# Patient Record
Sex: MALE | Race: WHITE | Employment: OTHER | URBAN - METROPOLITAN AREA
[De-identification: names, ages, dates, MRNs, and addresses within clinical notes are randomized per-mention and may not be internally consistent; named-entity substitution may affect disease eponyms.]

---

## 2019-10-22 ENCOUNTER — OFFICE VISIT (OUTPATIENT)
Dept: OTOLARYNGOLOGY | Facility: CLINIC | Age: 64
End: 2019-10-22
Payer: COMMERCIAL

## 2019-10-22 ENCOUNTER — TELEPHONE (OUTPATIENT)
Dept: ENDOCRINOLOGY | Facility: CLINIC | Age: 64
End: 2019-10-22

## 2019-10-22 VITALS
HEIGHT: 68 IN | OXYGEN SATURATION: 96 % | BODY MASS INDEX: 32.74 KG/M2 | DIASTOLIC BLOOD PRESSURE: 96 MMHG | HEART RATE: 94 BPM | WEIGHT: 216 LBS | SYSTOLIC BLOOD PRESSURE: 156 MMHG

## 2019-10-22 DIAGNOSIS — H69.91 DYSFUNCTION OF RIGHT EUSTACHIAN TUBE: Primary | ICD-10-CM

## 2019-10-22 DIAGNOSIS — J32.4 CHRONIC PANSINUSITIS: ICD-10-CM

## 2019-10-22 DIAGNOSIS — H65.91 OME (OTITIS MEDIA WITH EFFUSION), RIGHT: ICD-10-CM

## 2019-10-22 PROCEDURE — 69433 CREATE EARDRUM OPENING: CPT | Mod: RT | Performed by: OTOLARYNGOLOGY

## 2019-10-22 PROCEDURE — 99204 OFFICE O/P NEW MOD 45 MIN: CPT | Mod: 25 | Performed by: OTOLARYNGOLOGY

## 2019-10-22 RX ORDER — METHYLPREDNISOLONE 4 MG
TABLET, DOSE PACK ORAL
Qty: 21 TABLET | Refills: 0 | Status: SHIPPED | OUTPATIENT
Start: 2019-10-22 | End: 2019-10-22

## 2019-10-22 RX ORDER — OFLOXACIN 3 MG/ML
5 SOLUTION AURICULAR (OTIC) 2 TIMES DAILY
Qty: 10 ML | Refills: 0 | Status: SHIPPED | OUTPATIENT
Start: 2019-10-22 | End: 2019-10-22

## 2019-10-22 RX ORDER — METHYLPREDNISOLONE 4 MG
TABLET, DOSE PACK ORAL
Qty: 21 TABLET | Refills: 0 | Status: SHIPPED | OUTPATIENT
Start: 2019-10-22

## 2019-10-22 RX ORDER — OFLOXACIN 3 MG/ML
5 SOLUTION AURICULAR (OTIC) 2 TIMES DAILY
Qty: 10 ML | Refills: 0 | Status: SHIPPED | OUTPATIENT
Start: 2019-10-22 | End: 2021-01-20

## 2019-10-22 ASSESSMENT — MIFFLIN-ST. JEOR: SCORE: 1754.27

## 2019-10-22 NOTE — PROGRESS NOTES
Chief Complaint - sinusitis, right ear plugging    History of Present Illness - Rick Wayne is a 64 year old male who presents for evaluation of chronic sinusitis with acute exacerbation. The patient describes symptoms of right ear plugging, left purulent mucous, trying valsalva and hears a whistle in the left side. This is going on for months. The patient notes dall allergies. Treatments have included antibiotics (keflex), nasal steroids, nasal saline irrigations, oral antihistamines, and sudafed. The treatments seem to not help. 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 He saw Dr. Diaz in 2010. He thinks he had a right tympanic membrane perforation when he was young. No tubes. Has some pulsatile tinnitus.     Past Medical History -  - chronic sinusitis with nasal polyps  - spleen marginal zone lymphoma  - ETD    Current Medications -   Current Outpatient Medications:      GEMFIBROZIL 300 MG PO HALF-TABS, twice daily, Disp: , Rfl:      LORAZEPAM 0.5 MG PO TABS, prn, Disp: , Rfl:      PRILOSEC 10 MG PO CPDR, 2 CAPSULES DAILY, Disp: , Rfl:     Allergies - No Known Allergies    Social History -   Social History     Socioeconomic History     Marital status:      Spouse name: Not on file     Number of children: Not on file     Years of education: Not on file     Highest education level: Not on file   Occupational History     Not on file   Social Needs     Financial resource strain: Not on file     Food insecurity:     Worry: Not on file     Inability: Not on file     Transportation needs:     Medical: Not on file     Non-medical: Not on file   Tobacco Use     Smoking status: Current Every Day Smoker     Packs/day: 1.00   Substance and Sexual Activity     Alcohol use: Yes     Drug use: No     Sexual activity: Not on file   Lifestyle     Physical activity:     Days per week: Not on file     Minutes per session:  "Not on file     Stress: Not on file   Relationships     Social connections:     Talks on phone: Not on file     Gets together: Not on file     Attends Rastafari service: Not on file     Active member of club or organization: Not on file     Attends meetings of clubs or organizations: Not on file     Relationship status: Not on file     Intimate partner violence:     Fear of current or ex partner: Not on file     Emotionally abused: Not on file     Physically abused: Not on file     Forced sexual activity: Not on file   Other Topics Concern     Parent/sibling w/ CABG, MI or angioplasty before 65F 55M? Not Asked   Social History Narrative     Not on file       Family History - brother may have had an ear tube    Review of Systems - As per HPI and PMHx, otherwise 10+ comprehensive system review is negative.    Physical Exam  BP (!) 156/96   Pulse 94   Ht 1.727 m (5' 8\")   Wt 98 kg (216 lb)   SpO2 96%   BMI 32.84 kg/m    General - The patient is nontoxic, in no distress. Alert and oriented to person and place, answers questions and cooperates with examination appropriately.   Neurologic - CN II-XII are intact. No focal neurologic deficits.   Voice and Breathing - The patient was breathing comfortably without the use of accessory muscles. There was no wheezing, stridor, or stertor.  The patients voice was clear and strong.  Eyes - Extraocular movements intact.  Sclera were not icteric or injected, conjunctiva were pink and moist.  Mouth - Examination of the oral cavity showed pink, healthy oral mucosa. No lesions or ulcerations noted.  The tongue was mobile and midline.  Throat - The walls of the oropharynx were smooth, symmetric, and had no lesions or ulcerations.  No postnasal drainage.  The uvula was midline on elevation.  Ears - both ear canals are clean and clear.  Right tympanic membrane is retracted with a fuentes effusion, and no acute infection.  Left ear canal is clear.  The left tympanic membrane is " retracted.  He is able to Valsalva and inflate the left middle ear and I can see a tiny microperforation with air egress in the superior posterior quadrant.  No significant fluid in the middle ear.  Nose - External contour is symmetric, no gross deflection or scars.  Nasal mucosa is pink and moist with no abnormal mucus.  The septum was mildly deviated to the right along the floor.  There is some scarring. turbinates somewhat hypertrophic.  No polyps, masses, or purulence noted on examination.  Neck - Palpation of the occipital, submental, submandibular, internal jugular chain, and supraclavicular nodes did not demonstrate any abnormal lymph nodes or masses. No parotid masses. Palpation of the thyroid was soft and smooth, with no nodules or goiter appreciated.  The trachea was mobile and midline.  Cardiovascular - carotid pulses are 2+ bilaterally, regular rhythm      right Myringotomy with Tube Placement    Procedure - After discussion of the risks and benefits of myringotomy, informed consent was signed and placed in the chart.  I began with the right side.  I proceeded to position the patient in a semi-supine position in the examination chair.  Using the binocular surgical microscope, I then proceeded to clean the right ear canal free cerumen and squamous debris.  I was able to see the tympanic membrane.  Using a small cotton tipped applicator, I applied a tiny coating of phenol onto the tympanic membrane.  After visualizing a good eri, I then proceeded to use a myringotomy knife to make a radially oriented incision in the posterior and inferior quadrant of the right tympanic membrane.  A moderate amount of clear yellow effusion was suctioned away.  Next, I proceeded to place a duravent tube through the incision.  After confirming good positioning and a clearly visible open tube, the procedure was complete.      A/P - Rick Wayne is a 64 year old male with right otitis media with effusion. Has chronic ETD.  I placed a right ear tube today.  We discussed water precautions.  He should use ofloxacin for a few days to prevent infection.  He can also work on popping his ear for his left-sided eustachian tube dysfunction.  He has a microperforation on the left but no fluid or infection.  There is chronic sinusitis and some pressure and therefore I recommend a Medrol Dosepak.  Return if this fails, and we can consider a CT sinus scan.    Aric Shetty MD  Otolaryngology  [facility]

## 2019-10-22 NOTE — TELEPHONE ENCOUNTER
Patient is calling back and [phone]. He said he seen Dr. Shetty today and he prescribed him some medication.  Please call patient back when faxed at  [phone]

## 2019-10-22 NOTE — TELEPHONE ENCOUNTER
Reason for Call:  Other prescription    Detailed comments:  The rx's were sent to [facility]. They need the billing info for [facility]. They also need the auhorization number on [id number].  Please fax to [phone].     Phone Number Patient can be reached at: Home number on file [phone] (home)    Best Time:  Any     Can we leave a detailed message on this number? YES    Call taken on 10/22/2019 at 12:29 PM by Laurel Alexis

## 2019-10-22 NOTE — PATIENT INSTRUCTIONS
General Scheduling Information  To schedule your CT/MRI scan, please contact [place] [facility] at [phone] [address] [place] [address]    To schedule your Surgery, please contact our Specialty Schedulers at [phone]    ENT Clinic Locations Clinic Hours Telephone Number     [facility]  [address] [place] [address]   Tuesday:       8:00am -- 4:00pm    Wednesday:  8:00am - 4:00pm   To schedule an appointment with   Dr. Shetty,   please contact our   Specialty Scheduling Department at:     [phone]       [facility]  [address] [place] [address]   Friday:          8:00am - 4:00pm         Urgent Care Locations Clinic Hours Telephone Numbers     [facility]iano  [address] [place] [address]     Monday-Friday:     11:00pm - 9:00pm    Saturday-Sunday:  9:00am - 5:00pm   [phone]     [facility]zco  [address] [place] [address]     Monday-Friday:      5:00pm - 9:00pm     Saturday-Sunday:  9:00am - 5:00pm   [phone]     Rick to follow up with Primary Care provider regarding elevated blood pressure.

## 2019-10-22 NOTE — LETTER
10/22/2019         RE: Rick Wayne  [address]  [facility] [address]        Dear Colleague,    Thank you for referring your patient, Rick Wayne, to the [facility]. Please see a copy of my visit note below.    Chief Complaint - sinusitis, right ear plugging    History of Present Illness - Rick Wayne is a 64 year old male who presents for evaluation of chronic sinusitis with acute exacerbation. The patient describes symptoms of right ear plugging, left purulent mucous, trying valsalva and hears a whistle in the left side. This is going on for months. The patient notes dall allergies. Treatments have included antibiotics (keflex), nasal steroids, nasal saline irrigations, oral antihistamines, and sudafed. The treatments seem to not help. 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 He saw Dr. Diaz in 2010. He thinks he had a right tympanic membrane perforation when he was young. No tubes. Has some pulsatile tinnitus.     Past Medical History -  - chronic sinusitis with nasal polyps  - spleen marginal zone lymphoma  - ETD    Current Medications -   Current Outpatient Medications:      GEMFIBROZIL 300 MG PO HALF-TABS, twice daily, Disp: , Rfl:      LORAZEPAM 0.5 MG PO TABS, prn, Disp: , Rfl:      PRILOSEC 10 MG PO CPDR, 2 CAPSULES DAILY, Disp: , Rfl:     Allergies - No Known Allergies    Social History -   Social History     Socioeconomic History     Marital status:      Spouse name: Not on file     Number of children: Not on file     Years of education: Not on file     Highest education level: Not on file   Occupational History     Not on file   Social Needs     Financial resource strain: Not on file     Food insecurity:     Worry: Not on file     Inability: Not on file     Transportation needs:     Medical: Not on file     Non-medical: Not on file   Tobacco Use     Smoking  "status: Current Every Day Smoker     Packs/day: 1.00   Substance and Sexual Activity     Alcohol use: Yes     Drug use: No     Sexual activity: Not on file   Lifestyle     Physical activity:     Days per week: Not on file     Minutes per session: Not on file     Stress: Not on file   Relationships     Social connections:     Talks on phone: Not on file     Gets together: Not on file     Attends Hindu service: Not on file     Active member of club or organization: Not on file     Attends meetings of clubs or organizations: Not on file     Relationship status: Not on file     Intimate partner violence:     Fear of current or ex partner: Not on file     Emotionally abused: Not on file     Physically abused: Not on file     Forced sexual activity: Not on file   Other Topics Concern     Parent/sibling w/ CABG, MI or angioplasty before 65F 55M? Not Asked   Social History Narrative     Not on file       Family History - brother may have had an ear tube    Review of Systems - As per HPI and PMHx, otherwise 10+ comprehensive system review is negative.    Physical Exam  BP (!) 156/96   Pulse 94   Ht 1.727 m (5' 8\")   Wt 98 kg (216 lb)   SpO2 96%   BMI 32.84 kg/m     General - The patient is nontoxic, in no distress. Alert and oriented to person and place, answers questions and cooperates with examination appropriately.   Neurologic - CN II-XII are intact. No focal neurologic deficits.   Voice and Breathing - The patient was breathing comfortably without the use of accessory muscles. There was no wheezing, stridor, or stertor.  The patients voice was clear and strong.  Eyes - Extraocular movements intact.  Sclera were not icteric or injected, conjunctiva were pink and moist.  Mouth - Examination of the oral cavity showed pink, healthy oral mucosa. No lesions or ulcerations noted.  The tongue was mobile and midline.  Throat - The walls of the oropharynx were smooth, symmetric, and had no lesions or ulcerations.  No " postnasal drainage.  The uvula was midline on elevation.  Ears - both ear canals are clean and clear.  Right tympanic membrane is retracted with a fuentes effusion, and no acute infection.  Left ear canal is clear.  The left tympanic membrane is retracted.  He is able to Valsalva and inflate the left middle ear and I can see a tiny microperforation with air egress in the superior posterior quadrant.  No significant fluid in the middle ear.  Nose - External contour is symmetric, no gross deflection or scars.  Nasal mucosa is pink and moist with no abnormal mucus.  The septum was mildly deviated to the right along the floor.  There is some scarring. turbinates somewhat hypertrophic.  No polyps, masses, or purulence noted on examination.  Neck - Palpation of the occipital, submental, submandibular, internal jugular chain, and supraclavicular nodes did not demonstrate any abnormal lymph nodes or masses. No parotid masses. Palpation of the thyroid was soft and smooth, with no nodules or goiter appreciated.  The trachea was mobile and midline.  Cardiovascular - carotid pulses are 2+ bilaterally, regular rhythm      right Myringotomy with Tube Placement    Procedure - After discussion of the risks and benefits of myringotomy, informed consent was signed and placed in the chart.  I began with the right side.  I proceeded to position the patient in a semi-supine position in the examination chair.  Using the binocular surgical microscope, I then proceeded to clean the right ear canal free cerumen and squamous debris.  I was able to see the tympanic membrane.  Using a small cotton tipped applicator, I applied a tiny coating of phenol onto the tympanic membrane.  After visualizing a good eri, I then proceeded to use a myringotomy knife to make a radially oriented incision in the posterior and inferior quadrant of the right tympanic membrane.  A moderate amount of clear yellow effusion was suctioned away.  Next, I proceeded  to place a duravent tube through the incision.  After confirming good positioning and a clearly visible open tube, the procedure was complete.      A/P - Rick Wayne is a 64 year old male with right otitis media with effusion. Has chronic ETD. I placed a right ear tube today.  We discussed water precautions.  He should use ofloxacin for a few days to prevent infection.  He can also work on popping his ear for his left-sided eustachian tube dysfunction.  He has a microperforation on the left but no fluid or infection.  There is chronic sinusitis and some pressure and therefore I recommend a Medrol Dosepak.  Return if this fails, and we can consider a CT sinus scan.    Aric Shetty MD  Otolaryngology  [facility]      Again, thank you for allowing me to participate in the care of your patient.        Sincerely,        Aric Shetty MD

## 2019-11-25 ENCOUNTER — TELEPHONE (OUTPATIENT)
Dept: OTOLARYNGOLOGY | Facility: CLINIC | Age: 64
End: 2019-11-25

## 2019-11-25 DIAGNOSIS — J32.4 CHRONIC PANSINUSITIS: ICD-10-CM

## 2019-11-25 DIAGNOSIS — J32.4 CHRONIC PANSINUSITIS: Primary | ICD-10-CM

## 2019-11-25 RX ORDER — DOXYCYCLINE 100 MG/1
100 CAPSULE ORAL 2 TIMES DAILY
Qty: 28 CAPSULE | Refills: 0 | Status: SHIPPED | OUTPATIENT
Start: 2019-11-25 | End: 2019-11-26

## 2019-11-25 NOTE — TELEPHONE ENCOUNTER
Reason for call:  Other   Patient called regarding (reason for call): call back  Additional comments:  His sinus are bad. He finished the rx given. Please call to advise.     Phone number to reach patient:  Home number on file [phone] (home)    Best Time:  Any      Can we leave a detailed message on this number?  YES

## 2019-11-25 NOTE — TELEPHONE ENCOUNTER
10/22/19 Patient was seen in clinic by Dr. Shetty, given a Medrol Dosepak for chronic sinusitis. Note states patient should return if this fails, and can consider a CT sinus scan.     Patient reports pressure and pain under his left eye- kept him up most of the night last night. Patient feels he has had a fever, however check and does not at this time.   Reports nasal drainage that is bloody, clotted, yellow/stringy drainage. Patient states last time he had this drainage he tested positive for pseudomonas, so is very concerned.     Advised routing to provider to advise. Okay to leave detailed message for patient.     Razia Sparks RN on 11/25/2019 at 11:16 AM

## 2019-11-25 NOTE — TELEPHONE ENCOUNTER
Notified patient, who states that [phone] in order to have it filled.     Patient asking for what to do for the pressure in the mean time- we will start taking ibuprofen and tylenol regularly per package instruction. Any further recommendations- as he knows he won't get his medication for a couple more days at least.     Razia Sparks RN on 11/25/2019 at 11:52 AM

## 2019-11-25 NOTE — PROGRESS NOTES
I prescribed doxycycline. He should take this course and return to see me in clinic. If things worsen he needs to contact me sooner and we can consider urgent evaluation or CT sinuses.

## 2019-11-26 ENCOUNTER — TELEPHONE (OUTPATIENT)
Dept: OTOLARYNGOLOGY | Facility: CLINIC | Age: 64
End: 2019-11-26

## 2019-11-26 DIAGNOSIS — J32.4 CHRONIC PANSINUSITIS: ICD-10-CM

## 2019-11-26 RX ORDER — DOXYCYCLINE 100 MG/1
100 CAPSULE ORAL 2 TIMES DAILY
Qty: 28 CAPSULE | Refills: 0 | Status: SHIPPED | OUTPATIENT
Start: 2019-11-26 | End: 2020-03-18

## 2019-11-26 RX ORDER — DOXYCYCLINE 100 MG/1
100 CAPSULE ORAL 2 TIMES DAILY
Qty: 28 CAPSULE | Refills: 0 | Status: SHIPPED | OUTPATIENT
Start: 2019-11-26 | End: 2019-11-26

## 2019-11-26 NOTE — TELEPHONE ENCOUNTER
Reason for Call:  Other call back    Detailed comments: [facility] is calling stating that [facility] and that people had left for holiday's already and told him that he wouldn't be able to get the prescription for 4-6 weeks, but requests to have it sent to :     [facility]   [address] [place] [address]  Phone : [phone]    Phone Number Patient can be reached at: Home number on file [phone] (home)    Best Time: Any     Can we leave a detailed message on this number? YES    Call taken on 11/26/2019 at 1:33 PM by Bonnie Quinones

## 2019-12-12 ENCOUNTER — TELEPHONE (OUTPATIENT)
Dept: OTOLARYNGOLOGY | Facility: CLINIC | Age: 64
End: 2019-12-12

## 2019-12-12 NOTE — TELEPHONE ENCOUNTER
Reason for call:  Other   Patient called regarding (reason for call): call back  Additional comments:  Patient calling. He was treated for a sinus infection. His dentist is going to do a root canal and he is concerned about it. Please call to advise.     Phone number to reach patient:  Home number on file [phone] (home)    Best Time:  any    Can we leave a detailed message on this number?  YES

## 2019-12-12 NOTE — TELEPHONE ENCOUNTER
Called call and had concerns post root canal 14 days ago. He was having issue with drainage/blood post root canal. He was then giving abx as he was having s/s with the blood/drainage after. He is now going in for his permanent filling and is concerned that this will happen again. Wondering if 14 days since his root canal is enough to heal so this wont happen again. Will reach out to MD for direction    Vicky Garcia RN Specialty Triage 12/12/2019 2:02 PM

## 2019-12-13 NOTE — TELEPHONE ENCOUNTER
Left message advising patient that Dr. Shetty recommends patient discuss whether this will be an issue with his dentist.    Blane Cha RN....12/13/2019 8:45 AM

## 2020-02-11 ENCOUNTER — DOCUMENTATION ONLY (OUTPATIENT)
Dept: OPTOMETRY | Facility: CLINIC | Age: 65
End: 2020-02-11

## 2020-02-12 ENCOUNTER — OFFICE VISIT (OUTPATIENT)
Dept: OPTOMETRY | Facility: CLINIC | Age: 65
End: 2020-02-12
Payer: COMMERCIAL

## 2020-02-12 DIAGNOSIS — H52.4 PRESBYOPIA: ICD-10-CM

## 2020-02-12 DIAGNOSIS — H25.13 NUCLEAR AGE-RELATED CATARACT, BOTH EYES: ICD-10-CM

## 2020-02-12 DIAGNOSIS — H52.13 MYOPIA OF BOTH EYES: ICD-10-CM

## 2020-02-12 DIAGNOSIS — Z01.01 ENCOUNTER FOR EXAMINATION OF EYES AND VISION WITH ABNORMAL FINDINGS: Primary | ICD-10-CM

## 2020-02-12 DIAGNOSIS — H52.223 REGULAR ASTIGMATISM OF BOTH EYES: ICD-10-CM

## 2020-02-12 PROCEDURE — 92015 DETERMINE REFRACTIVE STATE: CPT | Performed by: OPTOMETRIST

## 2020-02-12 PROCEDURE — 92004 COMPRE OPH EXAM NEW PT 1/>: CPT | Performed by: OPTOMETRIST

## 2020-02-12 ASSESSMENT — CUP TO DISC RATIO
OD_RATIO: 0.35
OS_RATIO: 0.35

## 2020-02-12 ASSESSMENT — EXTERNAL EXAM - LEFT EYE: OS_EXAM: NORMAL

## 2020-02-12 ASSESSMENT — CONF VISUAL FIELD
OD_NORMAL: 1
OS_NORMAL: 1

## 2020-02-12 ASSESSMENT — TONOMETRY
IOP_METHOD: APPLANATION
OS_IOP_MMHG: 23
OD_IOP_MMHG: 22

## 2020-02-12 ASSESSMENT — SLIT LAMP EXAM - LIDS: COMMENTS: 1+ MEIBOMIAN GLAND DYSFUNCTION

## 2020-02-12 ASSESSMENT — REFRACTION_MANIFEST
OS_SPHERE: -1.25
OD_SPHERE: -1.25
OD_AXIS: 025
OD_ADD: +2.50
OD_CYLINDER: +0.50
OS_CYLINDER: +0.50
OS_AXIS: 162
OS_ADD: +2.50

## 2020-02-12 ASSESSMENT — VISUAL ACUITY
OD_SC+: -2
METHOD: SNELLEN - LINEAR
OD_SC: 20/30 -3
OS_SC+: -1
OS_SC: 20/40
OD_SC: 20/20
OS_SC: 20/25

## 2020-02-12 ASSESSMENT — EXTERNAL EXAM - RIGHT EYE: OD_EXAM: NORMAL

## 2020-02-12 NOTE — LETTER
2/12/2020         RE: Rick Wayne  [address]  [place] [address]        Dear Colleague,    Thank you for referring your patient, Rick Wayne, to the [facility]. Please see a copy of my visit note below.    Chief Complaint   Patient presents with     Annual Eye Exam      Accompanied by self  Last Eye Exam: 1+ years ago  Dilated Previously: Yes    What are you currently using to see?  Glasses for driving at night only       Distance Vision Acuity: Noticed gradual change in both eyes    Near Vision Acuity: Not satisfied-readers +2.00    Eye Comfort: watery -right eye, usually when lying on right side  Do you use eye drops? : No  Occupation or Hobbies: retired    Sabiha Hussein, Optometry Tech          Medical, surgical and family histories reviewed and updated 2/12/2020.       OBJECTIVE: See Ophthalmology exam    ASSESSMENT:    ICD-10-CM    1. Encounter for examination of eyes and vision with abnormal findings Z01.01    2. Nuclear age-related cataract, both eyes H25.13    3. Myopia of both eyes H52.13    4. Regular astigmatism of both eyes H52.223    5. Presbyopia H52.4       PLAN:     Patient Instructions   The eyes may tear excessively due to dryness. I recommend trying regular use of warm compresses over each eye to improve tear film secretions.   Instructions:   Warm compresses once to twice daily for 5-10 minutes  Directions for warm soaks  There are few methods for hot compresses. Moisten a washcloth with hot water, or microwave for 10 seconds, being careful to not get the cloth too hot.   Then put the washcloth onto your eyelids for 5 minutes. It will cool quickly so a rice pack or eyemask that can be heated and laid on top of the washcloth will help retain the heat.     Rick was advised of today's exam findings.  Copy of glasses Rx provided today. Separate distance and reading, per patient request.     Return in 1 year for eye exam, or sooner if needed.    The effects of  the dilating drops last for 4- 6 hours.  You will be more sensitive to light and vision will be blurry up close.  Mydriatic sunglasses were given if needed.      Max Milner O.D.  [address] [place]  [address]    [phone]             Again, thank you for allowing me to participate in the care of your patient.        Sincerely,        Max Milner OD

## 2020-02-12 NOTE — PROGRESS NOTES
Chief Complaint   Patient presents with     Annual Eye Exam      Accompanied by self  Last Eye Exam: 1+ years ago  Dilated Previously: Yes    What are you currently using to see?  Glasses for driving at night only       Distance Vision Acuity: Noticed gradual change in both eyes    Near Vision Acuity: Not satisfied-readers +2.00    Eye Comfort: watery -right eye, usually when lying on right side  Do you use eye drops? : No  Occupation or Hobbies: retired    Sabiha Hussein, Optometry Tech          Medical, surgical and family histories reviewed and updated 2/12/2020.       OBJECTIVE: See Ophthalmology exam    ASSESSMENT:    ICD-10-CM    1. Encounter for examination of eyes and vision with abnormal findings Z01.01    2. Nuclear age-related cataract, both eyes H25.13    3. Myopia of both eyes H52.13    4. Regular astigmatism of both eyes H52.223    5. Presbyopia H52.4       PLAN:     Patient Instructions   The eyes may tear excessively due to dryness. I recommend trying regular use of warm compresses over each eye to improve tear film secretions.   Instructions:   Warm compresses once to twice daily for 5-10 minutes  Directions for warm soaks  There are few methods for hot compresses. Moisten a washcloth with hot water, or microwave for 10 seconds, being careful to not get the cloth too hot.   Then put the washcloth onto your eyelids for 5 minutes. It will cool quickly so a rice pack or eyemask that can be heated and laid on top of the washcloth will help retain the heat.     Rick was advised of today's exam findings.  Copy of glasses Rx provided today. Separate distance and reading, per patient request.     Return in 1 year for eye exam, or sooner if needed.    The effects of the dilating drops last for 4- 6 hours.  You will be more sensitive to light and vision will be blurry up close.  Mydriatic sunglasses were given if needed.      Max Milner O.D.  [address] [place] [place]  [address]    [phone]

## 2020-02-12 NOTE — PATIENT INSTRUCTIONS
The eyes may tear excessively due to dryness. I recommend trying regular use of warm compresses over each eye to improve tear film secretions.   Instructions:   Warm compresses once to twice daily for 5-10 minutes  Directions for warm soaks  There are few methods for hot compresses. Moisten a washcloth with hot water, or microwave for 10 seconds, being careful to not get the cloth too hot.   Then put the washcloth onto your eyelids for 5 minutes. It will cool quickly so a rice pack or eyemask that can be heated and laid on top of the washcloth will help retain the heat.     Rick was advised of today's exam findings.  Copy of glasses Rx provided today. Separate distance and reading, per patient request.     Return in 1 year for eye exam, or sooner if needed.    The effects of the dilating drops last for 4- 6 hours.  You will be more sensitive to light and vision will be blurry up close.  Mydriatic sunglasses were given if needed.      Max Milner O.D.  [address]  [place]  [address]    [phone]

## 2020-02-13 ENCOUNTER — OFFICE VISIT (OUTPATIENT)
Dept: OTOLARYNGOLOGY | Facility: CLINIC | Age: 65
End: 2020-02-13
Payer: COMMERCIAL

## 2020-02-13 VITALS
BODY MASS INDEX: 32.58 KG/M2 | RESPIRATION RATE: 16 BRPM | HEIGHT: 68 IN | OXYGEN SATURATION: 96 % | HEART RATE: 80 BPM | DIASTOLIC BLOOD PRESSURE: 94 MMHG | SYSTOLIC BLOOD PRESSURE: 153 MMHG | WEIGHT: 215 LBS

## 2020-02-13 DIAGNOSIS — H69.92 DYSFUNCTION OF LEFT EUSTACHIAN TUBE: Primary | ICD-10-CM

## 2020-02-13 PROCEDURE — 99213 OFFICE O/P EST LOW 20 MIN: CPT | Mod: 25 | Performed by: OTOLARYNGOLOGY

## 2020-02-13 PROCEDURE — 69433 CREATE EARDRUM OPENING: CPT | Mod: LT | Performed by: OTOLARYNGOLOGY

## 2020-02-13 ASSESSMENT — PAIN SCALES - GENERAL: PAINLEVEL: NO PAIN (0)

## 2020-02-13 ASSESSMENT — MIFFLIN-ST. JEOR: SCORE: 1749.73

## 2020-02-13 NOTE — PROGRESS NOTES
Chief Complaint - sinusitis, ear plugging    History of Present Illness - Rick Wayne is a 64 year old male who returns with ear concerns. The patient describes symptoms of bilateral ear plugging. Sometimes pain. This has been going on for months. He does get a noise out of left ear with valsalva. The patient notes fall allergies. Treatments have included sudafed. The treatments seem to help a little as well as valsalva. 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 He saw Dr. Diaz in 2010. He thinks he had a right tympanic membrane perforation when he was young. He had a right OME last visit 10/2019. I placed a right ear tube. He has a microperforation on the left but no fluid or infection. He gets some throbbing at night in ears.     In December he had tooth #14 removed. He had some bleeding. He was concerned about an issue of the root into the sinus. I treated him with doxycycline and it got better.     Past Medical History -  - chronic sinusitis with nasal polyps  - spleen marginal zone lymphoma  - ETD    Current Medications -   Current Outpatient Medications:      doxycycline hyclate (VIBRAMYCIN) 100 MG capsule, Take 1 capsule (100 mg) by mouth 2 times daily, Disp: 28 capsule, Rfl: 0     GEMFIBROZIL 300 MG PO HALF-TABS, twice daily, Disp: , Rfl:      LORAZEPAM 0.5 MG PO TABS, prn, Disp: , Rfl:      methylPREDNISolone (MEDROL DOSEPAK) 4 MG tablet therapy pack, Follow Package Directions, Disp: 21 tablet, Rfl: 0     ofloxacin (FLOXIN) 0.3 % otic solution, Place 5 drops into the right ear 2 times daily, Disp: 10 mL, Rfl: 0     PRILOSEC 10 MG PO CPDR, 2 CAPSULES DAILY, Disp: , Rfl:     Allergies - No Known Allergies    Social History -   Social History     Socioeconomic History     Marital status:      Spouse name: Not on file     Number of children: Not on file     Years of education: Not on file     Highest  "education level: Not on file   Occupational History     Not on file   Social Needs     Financial resource strain: Not on file     Food insecurity:     Worry: Not on file     Inability: Not on file     Transportation needs:     Medical: Not on file     Non-medical: Not on file   Tobacco Use     Smoking status: Current Every Day Smoker     Packs/day: 1.00   Substance and Sexual Activity     Alcohol use: Yes     Drug use: No     Sexual activity: Not on file   Lifestyle     Physical activity:     Days per week: Not on file     Minutes per session: Not on file     Stress: Not on file   Relationships     Social connections:     Talks on phone: Not on file     Gets together: Not on file     Attends Taoism service: Not on file     Active member of club or organization: Not on file     Attends meetings of clubs or organizations: Not on file     Relationship status: Not on file     Intimate partner violence:     Fear of current or ex partner: Not on file     Emotionally abused: Not on file     Physically abused: Not on file     Forced sexual activity: Not on file   Other Topics Concern     Parent/sibling w/ CABG, MI or angioplasty before 65F 55M? Not Asked   Social History Narrative     Not on file       Family History - brother may have had an ear tube    Review of Systems - As per HPI and PMHx, otherwise 7 system review of the head and neck is negative.      Physical Exam  BP (!) 163/104   Pulse 84   Resp 16   Ht 1.727 m (5' 8\")   Wt 97.5 kg (215 lb)   SpO2 96%   BMI 32.69 kg/m    General - The patient is nontoxic, in no distress. Alert and oriented to person and place, answers questions and cooperates with examination appropriately.   Neurologic - CN II-XII are intact. No focal neurologic deficits.   Voice and Breathing - The patient was breathing comfortably without the use of accessory muscles. There was no wheezing, stridor, or stertor.  The patients voice was clear and strong.  Eyes - Extraocular movements " intact.  Sclera were not icteric or injected, conjunctiva were pink and moist.  Mouth - Examination of the oral cavity showed pink, healthy oral mucosa. No lesions or ulcerations noted.  The tongue was mobile and midline.  Throat - The walls of the oropharynx were smooth, symmetric, and had no lesions or ulcerations.  No postnasal drainage.  The uvula was midline on elevation.  Ears - both ear canals are clean and clear.  Right tympanic membrane is normal with a tube in good position. No otorrhea.  Left ear canal is clear.  The left tympanic membrane is retracted.  He is able to Valsalva and inflate the left middle ear and I can see a tiny microperforation with air egress in the superior posterior quadrant. I suctioned a little mucous from this.  Nose - External contour is symmetric, no gross deflection or scars.  Nasal mucosa is pink and moist with no abnormal mucus.  The septum was mildly deviated to the right along the floor.  There is some scarring. turbinates somewhat hypertrophic.  No polyps, masses, or purulence noted on examination.  Neck - Palpation of the occipital, submental, submandibular, internal jugular chain, and supraclavicular nodes did not demonstrate any abnormal lymph nodes or masses. No parotid masses. Palpation of the thyroid was soft and smooth, with no nodules or goiter appreciated.  The trachea was mobile and midline.    left Myringotomy with Tube Placement    Procedure - After discussion of the risks and benefits of myringotomy, informed consent was signed and placed in the chart.  I began with the left side.  I proceeded to position the patient in a semi-supine position in the examination chair.  Using the binocular surgical microscope, I then proceeded to clean the left ear canal free cerumen and squamous debris.  I was able to see the tympanic membrane.  Using a small cotton tipped applicator, I applied a tiny coating of phenol onto the posterior and inferior tympanic membrane.  After  visualizing a good eri, I then proceeded to use a myringotomy knife to make a radially oriented incision in the posterior and inferior quadrant of the left tympanic membrane.  A small amount of clear effusion was suctioned away.  Next, I proceeded to place a duravent tube through the incision.  After confirming good positioning and a clearly visible open tube, the procedure was complete.      A/P - Rick Wayne is a 64 year old male who has chronic ETD. I placed a right ear tube 4 months ago. He has a microperforation on the left but no fluid or infection.  It seems recently he is having more difficulty with this microperforation ventilating his ear.  He feels more plugging.  Therefore placed the left myringotomy tube today.  He now has tubes on both sides.  Mom to use ofloxacin drops on the left for the next few days.  He can use Sudafed as needed for congestion.  Return in 2 to 4 weeks with an audiogram.    Aric Shetty MD  Otolaryngology  [facility]

## 2020-02-13 NOTE — LETTER
2/13/2020         RE: Rick Wayne  [address]  [facility] [address]        Dear Colleague,    Thank you for referring your patient, Rick Wayne, to the [facility]. Please see a copy of my visit note below.    Chief Complaint - sinusitis, ear plugging    History of Present Illness - Rick Wayne is a 64 year old male who returns with ear concerns. The patient describes symptoms of bilateral ear plugging. Sometimes pain. This has been going on for months. He does get a noise out of left ear with valsalva. The patient notes fall allergies. Treatments have included sudafed. The treatments seem to help a little as well as valsalva. 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 He saw Dr. Diaz in 2010. He thinks he had a right tympanic membrane perforation when he was young. He had a right OME last visit 10/2019. I placed a right ear tube. He has a microperforation on the left but no fluid or infection. He gets some throbbing at night in ears.     In December he had tooth #14 removed. He had some bleeding. He was concerned about an issue of the root into the sinus. I treated him with doxycycline and it got better.     Past Medical History -  - chronic sinusitis with nasal polyps  - spleen marginal zone lymphoma  - ETD    Current Medications -   Current Outpatient Medications:      doxycycline hyclate (VIBRAMYCIN) 100 MG capsule, Take 1 capsule (100 mg) by mouth 2 times daily, Disp: 28 capsule, Rfl: 0     GEMFIBROZIL 300 MG PO HALF-TABS, twice daily, Disp: , Rfl:      LORAZEPAM 0.5 MG PO TABS, prn, Disp: , Rfl:      methylPREDNISolone (MEDROL DOSEPAK) 4 MG tablet therapy pack, Follow Package Directions, Disp: 21 tablet, Rfl: 0     ofloxacin (FLOXIN) 0.3 % otic solution, Place 5 drops into the right ear 2 times daily, Disp: 10 mL, Rfl: 0     PRILOSEC 10 MG PO CPDR, 2 CAPSULES DAILY, Disp: , Rfl:  "    Allergies - No Known Allergies    Social History -   Social History     Socioeconomic History     Marital status:      Spouse name: Not on file     Number of children: Not on file     Years of education: Not on file     Highest education level: Not on file   Occupational History     Not on file   Social Needs     Financial resource strain: Not on file     Food insecurity:     Worry: Not on file     Inability: Not on file     Transportation needs:     Medical: Not on file     Non-medical: Not on file   Tobacco Use     Smoking status: Current Every Day Smoker     Packs/day: 1.00   Substance and Sexual Activity     Alcohol use: Yes     Drug use: No     Sexual activity: Not on file   Lifestyle     Physical activity:     Days per week: Not on file     Minutes per session: Not on file     Stress: Not on file   Relationships     Social connections:     Talks on phone: Not on file     Gets together: Not on file     Attends Alevism service: Not on file     Active member of club or organization: Not on file     Attends meetings of clubs or organizations: Not on file     Relationship status: Not on file     Intimate partner violence:     Fear of current or ex partner: Not on file     Emotionally abused: Not on file     Physically abused: Not on file     Forced sexual activity: Not on file   Other Topics Concern     Parent/sibling w/ CABG, MI or angioplasty before 65F 55M? Not Asked   Social History Narrative     Not on file       Family History - brother may have had an ear tube    Review of Systems - As per HPI and PMHx, otherwise 7 system review of the head and neck is negative.      Physical Exam  BP (!) 163/104   Pulse 84   Resp 16   Ht 1.727 m (5' 8\")   Wt 97.5 kg (215 lb)   SpO2 96%   BMI 32.69 kg/m     General - The patient is nontoxic, in no distress. Alert and oriented to person and place, answers questions and cooperates with examination appropriately.   Neurologic - CN II-XII are intact. No focal " neurologic deficits.   Voice and Breathing - The patient was breathing comfortably without the use of accessory muscles. There was no wheezing, stridor, or stertor.  The patients voice was clear and strong.  Eyes - Extraocular movements intact.  Sclera were not icteric or injected, conjunctiva were pink and moist.  Mouth - Examination of the oral cavity showed pink, healthy oral mucosa. No lesions or ulcerations noted.  The tongue was mobile and midline.  Throat - The walls of the oropharynx were smooth, symmetric, and had no lesions or ulcerations.  No postnasal drainage.  The uvula was midline on elevation.  Ears - both ear canals are clean and clear.  Right tympanic membrane is normal with a tube in good position. No otorrhea.  Left ear canal is clear.  The left tympanic membrane is retracted.  He is able to Valsalva and inflate the left middle ear and I can see a tiny microperforation with air egress in the superior posterior quadrant. I suctioned a little mucous from this.  Nose - External contour is symmetric, no gross deflection or scars.  Nasal mucosa is pink and moist with no abnormal mucus.  The septum was mildly deviated to the right along the floor.  There is some scarring. turbinates somewhat hypertrophic.  No polyps, masses, or purulence noted on examination.  Neck - Palpation of the occipital, submental, submandibular, internal jugular chain, and supraclavicular nodes did not demonstrate any abnormal lymph nodes or masses. No parotid masses. Palpation of the thyroid was soft and smooth, with no nodules or goiter appreciated.  The trachea was mobile and midline.    left Myringotomy with Tube Placement    Procedure - After discussion of the risks and benefits of myringotomy, informed consent was signed and placed in the chart.  I began with the left side.  I proceeded to position the patient in a semi-supine position in the examination chair.  Using the binocular surgical microscope, I then proceeded to  clean the left ear canal free cerumen and squamous debris.  I was able to see the tympanic membrane.  Using a small cotton tipped applicator, I applied a tiny coating of phenol onto the posterior and inferior tympanic membrane.  After visualizing a good eri, I then proceeded to use a myringotomy knife to make a radially oriented incision in the posterior and inferior quadrant of the left tympanic membrane.  A small amount of clear effusion was suctioned away.  Next, I proceeded to place a duravent tube through the incision.  After confirming good positioning and a clearly visible open tube, the procedure was complete.      MAYDA/P - Rick Wayne is a 64 year old male who has chronic ETD. I placed a right ear tube 4 months ago. He has a microperforation on the left but no fluid or infection.  It seems recently he is having more difficulty with this microperforation ventilating his ear.  He feels more plugging.  Therefore placed the left myringotomy tube today.  He now has tubes on both sides.  Mom to use ofloxacin drops on the left for the next few days.  He can use Sudafed as needed for congestion.  Return in 2 to 4 weeks with an audiogram.    Aric Shetty MD  Otolaryngology  [facility]      Again, thank you for allowing me to participate in the care of your patient.        Sincerely,        Aric Shetty MD

## 2020-02-13 NOTE — PATIENT INSTRUCTIONS
General Scheduling Information  To schedule your CT/MRI scan, please contact [place] [facility] at [phone]   [address] [place] [place] [address]    To schedule your Surgery, please contact our Specialty Schedulers at [phone]    ENT Clinic Locations Clinic Hours Telephone Number     Jonnathan Miramontes  [address] [place] [address]   Tuesday:       8:00am -- 4:00pm    Wednesday:  8:00am - 4:00pm   To schedule an appointment with   Dr. Shetty,   please contact our   Specialty Scheduling Department at:     [phone]       [facility] Verduzco  [address] [place] [address]   Friday:          8:00am - 4:00pm         Urgent Care Locations Clinic Hours Telephone Numbers     Jonnathan Soriano  [address] [place] [address]     Monday-Friday:     11:00pm - 9:00pm    Saturday-Sunday:  9:00am - 5:00pm   [phone]     Jonnathan Verduzco  [address] [place] [address]     Monday-Friday:      5:00pm - 9:00pm     Saturday-Sunday:  9:00am - 5:00pm   [phone]

## 2020-02-13 NOTE — NURSING NOTE
Rick to follow up with Primary Care provider regarding elevated blood pressure.     Elissa Vizcaino MA

## 2020-03-10 ENCOUNTER — OFFICE VISIT (OUTPATIENT)
Dept: OTOLARYNGOLOGY | Facility: CLINIC | Age: 65
End: 2020-03-10
Payer: COMMERCIAL

## 2020-03-10 ENCOUNTER — OFFICE VISIT (OUTPATIENT)
Dept: AUDIOLOGY | Facility: CLINIC | Age: 65
End: 2020-03-10
Payer: COMMERCIAL

## 2020-03-10 VITALS
BODY MASS INDEX: 32.69 KG/M2 | SYSTOLIC BLOOD PRESSURE: 146 MMHG | TEMPERATURE: 98.2 F | DIASTOLIC BLOOD PRESSURE: 90 MMHG | HEART RATE: 79 BPM | WEIGHT: 215 LBS

## 2020-03-10 DIAGNOSIS — H90.6 MIXED HEARING LOSS, BILATERAL: Primary | ICD-10-CM

## 2020-03-10 DIAGNOSIS — Z96.22 S/P MYRINGOTOMY WITH INSERTION OF TUBE: Primary | ICD-10-CM

## 2020-03-10 DIAGNOSIS — H90.3 SENSORINEURAL HEARING LOSS (SNHL) OF BOTH EARS: ICD-10-CM

## 2020-03-10 PROCEDURE — 92567 TYMPANOMETRY: CPT | Performed by: AUDIOLOGIST

## 2020-03-10 PROCEDURE — 99213 OFFICE O/P EST LOW 20 MIN: CPT | Performed by: OTOLARYNGOLOGY

## 2020-03-10 PROCEDURE — 99207 ZZC NO CHARGE LOS: CPT | Performed by: AUDIOLOGIST

## 2020-03-10 PROCEDURE — 92557 COMPREHENSIVE HEARING TEST: CPT | Performed by: AUDIOLOGIST

## 2020-03-10 NOTE — PROGRESS NOTES
AUDIOLOGY REPORT:    Patient was referred from ENT by Dr. Shetty for audiology evaluation. The patient reports that he previously had PE tubes in both ears. He had a new left tube placed on 2/13/2020. He reports that his right ear has recently been popping and that the throbbing sensation he had before getting a tube in that ear is coming back. He reports that his hearing varies with the ear pressure, which is relieved when the ear pops. He reports that he tried the ear drops again which helped a little bit. He reports occasional popping in the left ear as well. The patient reports a history of loud noise exposure in the  and from loud music.    Testing:    Otoscopy:   Otoscopic exam indicates a clear right canal and PE tube present in the left ear.    Tympanograms:    RIGHT: large ear canal volume consistent with patent PE tube or tympanic membrane perforation     LEFT:   could not seal    Thresholds:   Pure Tone Thresholds assessed using conventional audiometry with good reliability from 250-8000 Hz bilaterally using insert earphones and circumaural headphones     RIGHT:  mild to severe likely sensorineural hearing loss at 8520-5237 Hz with mild conductive hearing loss at 250 Hz and normal hearing sensitivity at all other tested frequencies    LEFT:    normal hearing sensitivity through 3000 Hz sloping to mild to severe likely mixed hearing loss    Speech Reception Threshold:    RIGHT: 20 dB HL    LEFT:   25 dB HL  Results are in agreement with pure tone average.     Word Recognition Score:     RIGHT: 96% at 60 dB HL using NU-6 recorded word list.    LEFT:   96% at 65 dB HL using NU-6 recorded word list.    Discussed results with the patient.     Patient was returned to ENT for follow up.     Gail Miguel, CCC-A  Licensed Audiologist #[id number]  3/10/2020

## 2020-03-10 NOTE — PROGRESS NOTES
Chief Complaint - sinusitis, ear plugging    History of Present Illness - Rick Wayne is a 64 year old male who returns to recheck ears. The patient describes symptoms of bilateral ear plugging. Sometimes pain. This has been going on for months. 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 He saw Dr. Diaz in 2010. He thinks he had a right tympanic membrane perforation when he was young. He had a right OME on 10/2019. I placed a right ear tube. He has a microperforation on the left last visit and I placed a left ear tube. He returns and noted 9 days ago was having right ear snapping and popping. He used drops and that helped. Had that happen last night. He tried popping ears. Left ear feels better with the tube. Has a throb in the right ear.       Past Medical History -  - chronic sinusitis with nasal polyps  - spleen marginal zone lymphoma  - ETD    Current Medications -   Current Outpatient Medications:      doxycycline hyclate (VIBRAMYCIN) 100 MG capsule, Take 1 capsule (100 mg) by mouth 2 times daily, Disp: 28 capsule, Rfl: 0     GEMFIBROZIL 300 MG PO HALF-TABS, twice daily, Disp: , Rfl:      LORAZEPAM 0.5 MG PO TABS, prn, Disp: , Rfl:      methylPREDNISolone (MEDROL DOSEPAK) 4 MG tablet therapy pack, Follow Package Directions, Disp: 21 tablet, Rfl: 0     ofloxacin (FLOXIN) 0.3 % otic solution, Place 5 drops into the right ear 2 times daily, Disp: 10 mL, Rfl: 0     PRILOSEC 10 MG PO CPDR, 2 CAPSULES DAILY, Disp: , Rfl:     Allergies - No Known Allergies    Social History -   Social History     Socioeconomic History     Marital status:      Spouse name: Not on file     Number of children: Not on file     Years of education: Not on file     Highest education level: Not on file   Occupational History     Not on file   Social Needs     Financial resource strain: Not on file     Food insecurity:     Worry: Not  on file     Inability: Not on file     Transportation needs:     Medical: Not on file     Non-medical: Not on file   Tobacco Use     Smoking status: Current Every Day Smoker     Packs/day: 1.00   Substance and Sexual Activity     Alcohol use: Yes     Drug use: No     Sexual activity: Not on file   Lifestyle     Physical activity:     Days per week: Not on file     Minutes per session: Not on file     Stress: Not on file   Relationships     Social connections:     Talks on phone: Not on file     Gets together: Not on file     Attends [employer] service: Not on file     Active member of club or organization: Not on file     Attends meetings of clubs or organizations: Not on file     Relationship status: Not on file     Intimate partner violence:     Fear of current or ex partner: Not on file     Emotionally abused: Not on file     Physically abused: Not on file     Forced sexual activity: Not on file   Other Topics Concern     Parent/sibling w/ CABG, MI or angioplasty before 65F 55M? Not Asked   Social History Narrative     Not on file       Family History - brother may have had an ear tube    Review of Systems - As per HPI and PMHx, otherwise 7 system review of the head and neck is negative.    Physical Exam  BP (!) 146/90   Pulse 79   Temp 98.2  F (36.8  C) (Oral)   Wt 97.5 kg (215 lb)   BMI 32.69 kg/m    General - The patient is nontoxic, in no distress. Alert and oriented to person and place, answers questions and cooperates with examination appropriately.   Neurologic - CN II-XII are intact. No focal neurologic deficits.   Voice and Breathing - The patient was breathing comfortably without the use of accessory muscles. There was no wheezing, stridor, or stertor.  The patients voice was clear and strong.  Eyes - Extraocular movements intact.  Sclera were not icteric or injected, conjunctiva were pink and moist.  Ears - both ear canals are clean and clear.  Right tympanic membrane ear tube has fallen into the  middle ear space with a small tympanic membrane perforation. I incised this perforation with a blade. I reached into the middle ear with an alligator and pulled the tube. No otorrhea.  Left ear canal is clear. The left tympanic membrane has a duravent in good position, patent.  No fluid or infection.  Neck - Palpation of the occipital, submental, submandibular, internal jugular chain, and supraclavicular nodes did not demonstrate any abnormal lymph nodes or masses. No parotid masses. Palpation of the thyroid was soft and smooth, with no nodules or goiter appreciated.  The trachea was mobile and midline.    Audio - type B high volume. Has down-sloping sensorineural hearing loss both ears, mild conductive component.       A/P - Rick Wayne is a 64 year old male who has chronic ETD. I placed a right ear tube 4 months ago, and a left tube last month.  He was doing well until recently had a right ear snapping and popping symptoms.  Interestingly the Dura-Vent tube had migrated into the middle ear and the perforation was nearly closed.  I was able to perform a new myringotomy and remove the tube today.  I will let this close and hopefully his ears will be okay.  The left ear still has a tube but no infection.  Return as needed.    Aric Shetty MD  Otolaryngology  [facility]

## 2020-03-10 NOTE — LETTER
3/10/2020         RE: Rick Wayne  [address]  [facility] [address]        Dear Colleague,    Thank you for referring your patient, Rick Wayne, to the [facility]. Please see a copy of my visit note below.    Chief Complaint - sinusitis, ear plugging    History of Present Illness - Rick Wayne is a 64 year old male who returns to recheck ears. The patient describes symptoms of bilateral ear plugging. Sometimes pain. This has been going on for months. 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 He saw Dr. Diaz in 2010. He thinks he had a right tympanic membrane perforation when he was young. He had a right OME on 10/2019. I placed a right ear tube. He has a microperforation on the left last visit and I placed a left ear tube. He returns and noted 9 days ago was having right ear snapping and popping. He used drops and that helped. Had that happen last night. He tried popping ears. Left ear feels better with the tube. Has a throb in the right ear.       Past Medical History -  - chronic sinusitis with nasal polyps  - spleen marginal zone lymphoma  - ETD    Current Medications -   Current Outpatient Medications:      doxycycline hyclate (VIBRAMYCIN) 100 MG capsule, Take 1 capsule (100 mg) by mouth 2 times daily, Disp: 28 capsule, Rfl: 0     GEMFIBROZIL 300 MG PO HALF-TABS, twice daily, Disp: , Rfl:      LORAZEPAM 0.5 MG PO TABS, prn, Disp: , Rfl:      methylPREDNISolone (MEDROL DOSEPAK) 4 MG tablet therapy pack, Follow Package Directions, Disp: 21 tablet, Rfl: 0     ofloxacin (FLOXIN) 0.3 % otic solution, Place 5 drops into the right ear 2 times daily, Disp: 10 mL, Rfl: 0     PRILOSEC 10 MG PO CPDR, 2 CAPSULES DAILY, Disp: , Rfl:     Allergies - No Known Allergies    Social History -   Social History     Socioeconomic History     Marital status:      Spouse name: Not on file      Number of children: Not on file     Years of education: Not on file     Highest education level: Not on file   Occupational History     Not on file   Social Needs     Financial resource strain: Not on file     Food insecurity:     Worry: Not on file     Inability: Not on file     Transportation needs:     Medical: Not on file     Non-medical: Not on file   Tobacco Use     Smoking status: Current Every Day Smoker     Packs/day: 1.00   Substance and Sexual Activity     Alcohol use: Yes     Drug use: No     Sexual activity: Not on file   Lifestyle     Physical activity:     Days per week: Not on file     Minutes per session: Not on file     Stress: Not on file   Relationships     Social connections:     Talks on phone: Not on file     Gets together: Not on file     Attends Oriental orthodox service: Not on file     Active member of club or organization: Not on file     Attends meetings of clubs or organizations: Not on file     Relationship status: Not on file     Intimate partner violence:     Fear of current or ex partner: Not on file     Emotionally abused: Not on file     Physically abused: Not on file     Forced sexual activity: Not on file   Other Topics Concern     Parent/sibling w/ CABG, MI or angioplasty before 65F 55M? Not Asked   Social History Narrative     Not on file       Family History - brother may have had an ear tube    Review of Systems - As per HPI and PMHx, otherwise 7 system review of the head and neck is negative.    Physical Exam  BP (!) 146/90   Pulse 79   Temp 98.2  F (36.8  C) (Oral)   Wt 97.5 kg (215 lb)   BMI 32.69 kg/m    General - The patient is nontoxic, in no distress. Alert and oriented to person and place, answers questions and cooperates with examination appropriately.   Neurologic - CN II-XII are intact. No focal neurologic deficits.   Voice and Breathing - The patient was breathing comfortably without the use of accessory muscles. There was no wheezing, stridor, or stertor.  The  patients voice was clear and strong.  Eyes - Extraocular movements intact.  Sclera were not icteric or injected, conjunctiva were pink and moist.  Ears - both ear canals are clean and clear.  Right tympanic membrane ear tube has fallen into the middle ear space with a small tympanic membrane perforation. I incised this perforation with a blade. I reached into the middle ear with an alligator and pulled the tube. No otorrhea.  Left ear canal is clear. The left tympanic membrane has a duravent in good position, patent.  No fluid or infection.  Neck - Palpation of the occipital, submental, submandibular, internal jugular chain, and supraclavicular nodes did not demonstrate any abnormal lymph nodes or masses. No parotid masses. Palpation of the thyroid was soft and smooth, with no nodules or goiter appreciated.  The trachea was mobile and midline.    Audio - type B high volume. Has down-sloping sensorineural hearing loss both ears, mild conductive component.       A/P - Rick Wayne is a 64 year old male who has chronic ETD. I placed a right ear tube 4 months ago, and a left tube last month.  He was doing well until recently had a right ear snapping and popping symptoms.  Interestingly the Dura-Vent tube had migrated into the middle ear and the perforation was nearly closed.  I was able to perform a new myringotomy and remove the tube today.  I will let this close and hopefully his ears will be okay.  The left ear still has a tube but no infection.  Return as needed.    Aric Shetty MD  Otolaryngology  [facility]      Again, thank you for allowing me to participate in the care of your patient.        Sincerely,        Aric Shetty MD

## 2020-03-18 ENCOUNTER — OFFICE VISIT (OUTPATIENT)
Dept: OTOLARYNGOLOGY | Facility: CLINIC | Age: 65
End: 2020-03-18
Payer: COMMERCIAL

## 2020-03-18 VITALS
BODY MASS INDEX: 32.71 KG/M2 | DIASTOLIC BLOOD PRESSURE: 90 MMHG | SYSTOLIC BLOOD PRESSURE: 128 MMHG | WEIGHT: 215.8 LBS | HEART RATE: 82 BPM | HEIGHT: 68 IN

## 2020-03-18 DIAGNOSIS — H69.91 DYSFUNCTION OF RIGHT EUSTACHIAN TUBE: ICD-10-CM

## 2020-03-18 DIAGNOSIS — H69.92 DYSFUNCTION OF LEFT EUSTACHIAN TUBE: Primary | ICD-10-CM

## 2020-03-18 DIAGNOSIS — Z96.22 RETAINED MYRINGOTOMY TUBE IN LEFT EAR: ICD-10-CM

## 2020-03-18 PROCEDURE — 99213 OFFICE O/P EST LOW 20 MIN: CPT | Performed by: OTOLARYNGOLOGY

## 2020-03-18 ASSESSMENT — MIFFLIN-ST. JEOR: SCORE: 1753.36

## 2020-03-18 NOTE — PROGRESS NOTES
Chief Complaint - sinusitis, ear plugging    History of Present Illness - Rick Wayne is a 64 year old male who returns to recheck ears. The patient describes symptoms of bilateral ear plugging. Sometimes pain. This has been going on for months. 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 He saw Dr. Diaz in 2010. He thinks he had a right tympanic membrane perforation when he was young. He had a right OME on 10/2019. I placed a right ear tube. He has a microperforation on the left last visit and I placed a left ear tube. He returns and noted a couple weeks ago was having right ear snapping and popping. He used drops and that helped. Then it returned last week. I saw him. He had a throb in the right ear. I noted the right tube had migrated into the right middle ear. I retrieved this with a myringotomy. The left tube is out. Right ear is improving. Still some noise with ear drops. Left ear mostly normal.        Past Medical History -  - chronic sinusitis with nasal polyps  - spleen marginal zone lymphoma  - ETD    Current Medications -   Current Outpatient Medications:      doxycycline hyclate (VIBRAMYCIN) 100 MG capsule, Take 1 capsule (100 mg) by mouth 2 times daily, Disp: 28 capsule, Rfl: 0     GEMFIBROZIL 300 MG PO HALF-TABS, twice daily, Disp: , Rfl:      LORAZEPAM 0.5 MG PO TABS, prn, Disp: , Rfl:      methylPREDNISolone (MEDROL DOSEPAK) 4 MG tablet therapy pack, Follow Package Directions, Disp: 21 tablet, Rfl: 0     ofloxacin (FLOXIN) 0.3 % otic solution, Place 5 drops into the right ear 2 times daily, Disp: 10 mL, Rfl: 0     PRILOSEC 10 MG PO CPDR, 2 CAPSULES DAILY, Disp: , Rfl:     Allergies - No Known Allergies    Social History -   Social History     Socioeconomic History     Marital status:      Spouse name: Not on file     Number of children: Not on file     Years of education: Not on file      "Highest education level: Not on file   Occupational History     Not on file   Social Needs     Financial resource strain: Not on file     Food insecurity:     Worry: Not on file     Inability: Not on file     Transportation needs:     Medical: Not on file     Non-medical: Not on file   Tobacco Use     Smoking status: Current Every Day Smoker     Packs/day: 1.00   Substance and Sexual Activity     Alcohol use: Yes     Drug use: No     Sexual activity: Not on file   Lifestyle     Physical activity:     Days per week: Not on file     Minutes per session: Not on file     Stress: Not on file   Relationships     Social connections:     Talks on phone: Not on file     Gets together: Not on file     Attends Congregational service: Not on file     Active member of club or organization: Not on file     Attends meetings of clubs or organizations: Not on file     Relationship status: Not on file     Intimate partner violence:     Fear of current or ex partner: Not on file     Emotionally abused: Not on file     Physically abused: Not on file     Forced sexual activity: Not on file   Other Topics Concern     Parent/sibling w/ CABG, MI or angioplasty before 65F 55M? Not Asked   Social History Narrative     Not on file       Family History - brother may have had an ear tube    Review of Systems - As per HPI and PMHx, otherwise 7 system review of the head and neck is negative.    Physical Exam  BP (!) 128/90   Pulse 82   Ht 1.727 m (5' 8\")   Wt 97.9 kg (215 lb 12.8 oz)   BMI 32.81 kg/m    General - The patient is nontoxic, in no distress. Alert and oriented to person and place, answers questions and cooperates with examination appropriately.   Neurologic - CN II-XII are intact. No focal neurologic deficits.   Voice and Breathing - The patient was breathing comfortably without the use of accessory muscles. There was no wheezing, stridor, or stertor.  The patients voice was clear and strong.  Eyes - Extraocular movements intact.  " Sclera were not icteric or injected, conjunctiva were pink and moist.  Ears - both ear canals are clean and clear.  Right tympanic membrane has healed. No perforation. No effusion. No otorrhea.  Left ear canal is clear. The left tympanic membrane has a duravent in good position, patent.  No fluid or infection.  Neck - Palpation of the occipital, submental, submandibular, internal jugular chain, and supraclavicular nodes did not demonstrate any abnormal lymph nodes or masses. No parotid masses. Palpation of the thyroid was soft and smooth, with no nodules or goiter appreciated.  The trachea was mobile and midline.    A/P - Rick Wayne is a 64 year old male who has chronic ETD. I placed a right ear tube 4 months ago, and a left tube last month.  He was doing well until recently had a right ear snapping and popping symptoms.  Interestingly the Dura-Vent tube had migrated into the middle ear and the perforation was nearly closed.  I was able to perform a new myringotomy and remove the tube last visit.  This is helped.  His perforation is closed.  No infection or effusion.  I recommend observation.  Left ear tube patent.  No left ear symptoms.  Recheck as needed.  I discussed water precautions.    Aric Shetty MD  Otolaryngology  [facility]

## 2020-03-18 NOTE — PROGRESS NOTES
Rick to follow up with Primary Care provider regarding elevated blood pressure.    Nuha Hui, CMA

## 2020-03-18 NOTE — LETTER
3/18/2020         RE: Rick Wayne  [address]  [facility] [address]        Dear Colleague,    Thank you for referring your patient, Rick Wayne, to the [facility]. Please see a copy of my visit note below.    Chief Complaint - sinusitis, ear plugging    History of Present Illness - iRck Wayne is a 64 year old male who returns to recheck ears. The patient describes symptoms of bilateral ear plugging. Sometimes pain. This has been going on for months. 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 He saw Dr. Diaz in 2010. He thinks he had a right tympanic membrane perforation when he was young. He had a right OME on 10/2019. I placed a right ear tube. He has a microperforation on the left last visit and I placed a left ear tube. He returns and noted a couple weeks ago was having right ear snapping and popping. He used drops and that helped. Then it returned last week. I saw him. He had a throb in the right ear. I noted the right tube had migrated into the right middle ear. I retrieved this with a myringotomy. The left tube is out. Right ear is improving. Still some noise with ear drops. Left ear mostly normal.        Past Medical History -  - chronic sinusitis with nasal polyps  - spleen marginal zone lymphoma  - ETD    Current Medications -   Current Outpatient Medications:      doxycycline hyclate (VIBRAMYCIN) 100 MG capsule, Take 1 capsule (100 mg) by mouth 2 times daily, Disp: 28 capsule, Rfl: 0     GEMFIBROZIL 300 MG PO HALF-TABS, twice daily, Disp: , Rfl:      LORAZEPAM 0.5 MG PO TABS, prn, Disp: , Rfl:      methylPREDNISolone (MEDROL DOSEPAK) 4 MG tablet therapy pack, Follow Package Directions, Disp: 21 tablet, Rfl: 0     ofloxacin (FLOXIN) 0.3 % otic solution, Place 5 drops into the right ear 2 times daily, Disp: 10 mL, Rfl: 0     PRILOSEC 10 MG PO CPDR, 2 CAPSULES DAILY, Disp: ,  "Rfl:     Allergies - No Known Allergies    Social History -   Social History     Socioeconomic History     Marital status:      Spouse name: Not on file     Number of children: Not on file     Years of education: Not on file     Highest education level: Not on file   Occupational History     Not on file   Social Needs     Financial resource strain: Not on file     Food insecurity:     Worry: Not on file     Inability: Not on file     Transportation needs:     Medical: Not on file     Non-medical: Not on file   Tobacco Use     Smoking status: Current Every Day Smoker     Packs/day: 1.00   Substance and Sexual Activity     Alcohol use: Yes     Drug use: No     Sexual activity: Not on file   Lifestyle     Physical activity:     Days per week: Not on file     Minutes per session: Not on file     Stress: Not on file   Relationships     Social connections:     Talks on phone: Not on file     Gets together: Not on file     Attends Anabaptist service: Not on file     Active member of club or organization: Not on file     Attends meetings of clubs or organizations: Not on file     Relationship status: Not on file     Intimate partner violence:     Fear of current or ex partner: Not on file     Emotionally abused: Not on file     Physically abused: Not on file     Forced sexual activity: Not on file   Other Topics Concern     Parent/sibling w/ CABG, MI or angioplasty before 65F 55M? Not Asked   Social History Narrative     Not on file       Family History - brother may have had an ear tube    Review of Systems - As per HPI and PMHx, otherwise 7 system review of the head and neck is negative.    Physical Exam  BP (!) 128/90   Pulse 82   Ht 1.727 m (5' 8\")   Wt 97.9 kg (215 lb 12.8 oz)   BMI 32.81 kg/m    General - The patient is nontoxic, in no distress. Alert and oriented to person and place, answers questions and cooperates with examination appropriately.   Neurologic - CN II-XII are intact. No focal neurologic " deficits.   Voice and Breathing - The patient was breathing comfortably without the use of accessory muscles. There was no wheezing, stridor, or stertor.  The patients voice was clear and strong.  Eyes - Extraocular movements intact.  Sclera were not icteric or injected, conjunctiva were pink and moist.  Ears - both ear canals are clean and clear.  Right tympanic membrane has healed. No perforation. No effusion. No otorrhea.  Left ear canal is clear. The left tympanic membrane has a duravent in good position, patent.  No fluid or infection.  Neck - Palpation of the occipital, submental, submandibular, internal jugular chain, and supraclavicular nodes did not demonstrate any abnormal lymph nodes or masses. No parotid masses. Palpation of the thyroid was soft and smooth, with no nodules or goiter appreciated.  The trachea was mobile and midline.    A/P - Rick Wayne is a 64 year old male who has chronic ETD. I placed a right ear tube 4 months ago, and a left tube last month.  He was doing well until recently had a right ear snapping and popping symptoms.  Interestingly the Dura-Vent tube had migrated into the middle ear and the perforation was nearly closed.  I was able to perform a new myringotomy and remove the tube last visit.  This is helped.  His perforation is closed.  No infection or effusion.  I recommend observation.  Left ear tube patent.  No left ear symptoms.  Recheck as needed.  I discussed water precautions.    Aric Shetty MD  Otolaryngology  [facility]      Rick to follow up with Primary Care provider regarding elevated blood pressure.    Nuha Hui CMA       Again, thank you for allowing me to participate in the care of your patient.        Sincerely,        Aric Shetty MD

## 2020-07-19 ENCOUNTER — OFFICE VISIT (OUTPATIENT)
Dept: URGENT CARE | Facility: URGENT CARE | Age: 65
End: 2020-07-19
Payer: COMMERCIAL

## 2020-07-19 VITALS
RESPIRATION RATE: 16 BRPM | OXYGEN SATURATION: 98 % | TEMPERATURE: 99.4 F | BODY MASS INDEX: 29.25 KG/M2 | WEIGHT: 192.38 LBS | DIASTOLIC BLOOD PRESSURE: 86 MMHG | SYSTOLIC BLOOD PRESSURE: 138 MMHG | HEART RATE: 91 BPM

## 2020-07-19 DIAGNOSIS — K57.32 DIVERTICULITIS OF COLON: Primary | ICD-10-CM

## 2020-07-19 PROCEDURE — 99203 OFFICE O/P NEW LOW 30 MIN: CPT | Performed by: FAMILY MEDICINE

## 2020-07-19 RX ORDER — METRONIDAZOLE 500 MG/1
500 TABLET ORAL 3 TIMES DAILY
Qty: 30 TABLET | Refills: 0 | Status: SHIPPED | OUTPATIENT
Start: 2020-07-19 | End: 2020-07-20

## 2020-07-19 RX ORDER — CIPROFLOXACIN 500 MG/1
500 TABLET, FILM COATED ORAL 2 TIMES DAILY
Qty: 20 TABLET | Refills: 0 | Status: SHIPPED | OUTPATIENT
Start: 2020-07-19 | End: 2020-07-20

## 2020-07-19 NOTE — PATIENT INSTRUCTIONS
Patient Education     Understanding Diverticulosis and Diverticulitis     Pouches or diverticula usually occur in the lower part of the colon called the sigmoid.   The colon (large intestine) is the last part of the digestive tract. It absorbs water from stool and changes it from a liquid to a solid. In certain cases, small pouches called diverticula can form in the colon wall. This condition is called diverticulosis. It's very common as people get older. The pouches can become infected. If this happens, it becomes a more serious problem called diverticulitis. These problems can be painful. But they can be managed.  Managing your condition  Diet changes or medicines may be prescribed.   If you have diverticulosis  Recommendations include:    Diet changes are often enough to control symptoms. The main changes are adding fiber (roughage) and drinking more water. Fiber absorbs water as it travels through your colon. This helps your stool stay soft and move smoothly. Water helps this process.    If needed, you may be told to take over-the-counter stool softeners.    To help ease pain, antispasmodic medicines may be prescribed.    Watch for changes in your bowel movements. Tell your healthcare provider if you notice any changes.    Begin an exercise program. Ask your healthcare provider how to get started.    Get plenty of rest and sleep.   If you have diverticulitis  Treatment depends on how bad your symptoms are.    For mild symptoms. You may be put on a liquid diet for a short time. Antibiotics are usually prescribed. If these 2 steps relieve your symptoms, you may then be prescribed a high-fiber diet. If you still have symptoms, your healthcare provider will discuss more treatment choices with you.    For severe symptoms. You may need to be admitted to the hospital. There, you can be given IV antibiotics and fluids. You will also be put on a low-fiber or liquid diet. Although not common, surgery is needed in some  people with severe symptoms.  [place] to colon health     Diverticulitis occurs when the pouches become infected or inflamed.     Help keep your colon healthy with a diet that includes plenty of high-fiber fruits, vegetables, and whole grains. Drink plenty of liquids like water and juice. Maintain a healthy lifestyle, including regular exercise, stress management, and adequate rest and sleep.   Date Last Reviewed: 7/1/2016 2000-2019 [employer]. [address] [place] [address], [place] [address]. All rights reserved. This information is not intended as a substitute for professional medical care. Always follow your healthcare professional's instructions.           Patient Education     Diverticulitis    Some people get pouches along the wall of the colon as they get older. The pouches, called diverticuli, usually cause no symptoms. If the pouches become blocked, you can get an infection. This infection is called diverticulitis. It causes pain in your lower abdomen and fever. If not treated, it can become a serious condition, causing an abscess to form inside the pouch. The abscess may block the intestinal tract even or rupture, spreading infection throughout the abdomen.  When treatment is started early, oral antibiotics alone may be enough to cure diverticulitis. This method is tried first. But, if you don't improve or if your condition gets worse while using oral antibiotics, you may need to be admitted to the hospital for IV antibiotics. Severe cases may require surgery.  Home care  The following guidelines will help you care for yourself at home:    During the acute illness, rest and follow your healthcare provider's instructions about diet. Sometimes you will need to follow a clear liquid diet to rest your bowel. Once your symptoms are better, you may be told to follow a low-fiber diet for some time. Include foods like:  ? Flake cereal, mashed potatoes, pancakes, waffles, pasta, white bread, rice,  applesauce, bananas, eggs, fish, poultry, tofu, and cooked soft vegetables    Take antibiotics exactly as instructed. Don't miss any doses or stop taking the medication, even if you feel better.    Monitor your temperature and tell your healthcare provider if you have rising temperatures.  Preventing future attacks  Once you have an episode of diverticulitis, you are at risk for having it again. After you have recovered from this episode, you may be able to lower your risk by eating a high-fiber diet (20 gm/day to 35 gm/day of fiber). This cleans out the colon pouches that already exist and may prevent new ones from forming. Foods high in fiber include fresh fruits and edible peelings, raw or lightly cooked vegetables, whole grain cereals and breads, dried beans and peas, and bran.  Other steps that can help prevent future attacks include:    Take your medicines, such as antibiotics, as your healthcare provider says.    Drink 6 to 8 glasses of water every day, unless told otherwise.    Use a heating pad or hot water bottle to help abdominal cramping or pain.    Begin an exercise program. Ask your healthcare provider how to get started. You can benefit from simple activities such as walking or gardening.    Treat diarrhea with a bland diet. Start with liquids only; then slowly add fiber over time.    Watch for changes in your bowel movements (constipation to diarrhea). Avoid constipation by eating a high fiber diet and taking a stool softener if needed.    Get plenty of rest and sleep.  Follow-up care  Follow up with your healthcare provider as advised or sooner if you are not getting better in the next 2 days.  When to seek medical advice  Call your healthcare provider right away if any of these occur:    Fever of 100.4 F (38 C) or higher, or as directed by your healthcare provider    Repeated vomiting or swelling of the abdomen    Weakness, dizziness, light-headedness    Pain in your abdomen that gets worse,  severe, or spreads to your back    Pain that moves to the right lower abdomen    Rectal bleeding (stools that are red, black or maroon color)    Unexpected vaginal bleeding  Date Last Reviewed: 9/1/2016 2000-2019 [employer]. [address] [place] [address], [place] [address]. All rights reserved. This information is not intended as a substitute for professional medical care. Always follow your healthcare professional's instructions.

## 2020-07-19 NOTE — PROGRESS NOTES
Subjective     Rick Wayne is a 65 year old male who presents to clinic today for the following health issues:    HPI     Patient reports previous history of diverticulitis almost 2 years ago.  He reports for the past  4 to 5 days been having left lower quadrant pain, tenderness, he has no nausea, no vomiting, no diarrhea no blood in his urine or stool.  He denies recent injury or trauma.  Maintaining eating and drinking well.  And he has no fever no chills.  No diarrhea blood in the stool.    There is no problem list on file for this patient.    Past Surgical History:   Procedure Laterality Date     SURGICAL HISTORY OF -   1988    THUMB      SURGICAL HISTORY OF -   1992    RT ANKLE-COMPOUND FRACTURE       Social History     Tobacco Use     Smoking status: Current Every Day Smoker     Packs/day: 1.00     Smokeless tobacco: Never Used   Substance Use Topics     Alcohol use: Yes     Family History   Problem Relation Age of Onset     Glaucoma No family hx of      Macular Degeneration No family hx of          Current Outpatient Medications   Medication Sig Dispense Refill     ciprofloxacin (CIPRO) 500 MG tablet Take 1 tablet (500 mg) by mouth 2 times daily for 10 days 20 tablet 0     metroNIDAZOLE (FLAGYL) 500 MG tablet Take 1 tablet (500 mg) by mouth 3 times daily for 10 days 30 tablet 0     GEMFIBROZIL 300 MG PO HALF-TABS twice daily       LORAZEPAM 0.5 MG PO TABS prn       methylPREDNISolone (MEDROL DOSEPAK) 4 MG tablet therapy pack Follow Package Directions (Patient not taking: Reported on 7/19/2020) 21 tablet 0     ofloxacin (FLOXIN) 0.3 % otic solution Place 5 drops into the right ear 2 times daily (Patient not taking: Reported on 7/19/2020) 10 mL 0     PRILOSEC 10 MG PO CPDR 2 CAPSULES DAILY       Allergies   Allergen Reactions     Topiramate      Other reaction(s): Facial swelling     Vardenafil      Other reaction(s): Nasal congestion     No lab results found.     Reviewed and updated as needed this visit by  "Provider  Med Hx         Review of Systems   Constitutional, HEENT, cardiovascular, pulmonary, GI, , musculoskeletal, neuro, skin, endocrine and psych systems are negative, except as otherwise noted.      Objective    /86 (BP Location: Left arm, Patient Position: Chair, Cuff Size: Adult Regular)   Pulse 91   Temp 99.4  F (37.4  C) (Tympanic)   Resp 16   Wt 87.3 kg (192 lb 6 oz)   SpO2 98%   BMI 29.25 kg/m    Body mass index is 29.25 kg/m .  Physical Exam   GENERAL: healthy, alert and no distress  RESP: lungs clear to auscultation - no rales, rhonchi or wheezes  CV: regular rate and rhythm, normal S1 S2, no S3 or S4, no murmur, click or rub, no peripheral edema and peripheral pulses strong  ABDOMEN: tenderness LLQ and bowel sounds normal  MS: no gross musculoskeletal defects noted, no edema  BACK: no CVA tenderness, no paralumbar tenderness    Diagnostic Test Results:  Labs reviewed in Epic  none         Assessment & Plan       ICD-10-CM    1. Diverticulitis of colon  K57.32 metroNIDAZOLE (FLAGYL) 500 MG tablet     ciprofloxacin (CIPRO) 500 MG tablet     Discussed diet modification, increase fluid intake, eat more soft, soft diet for the next 4 to 5 days.  Until symptom resolved, then advance diet as tolerated.    Discussed with patient if symptoms worsening does not feel better in the next 48 to 72 hours to go to the ER.  Tobacco Cessation:   reports that he has been smoking. He has been smoking about 1.00 pack per day. He has never used smokeless tobacco.      BMI:   Estimated body mass index is 29.25 kg/m  as calculated from the following:    Height as of 3/18/20: 1.727 m (5' 8\").    Weight as of this encounter: 87.3 kg (192 lb 6 oz).   Weight management plan: Discussed healthy diet and exercise guidelines        Patient Instructions       Patient Education     Understanding Diverticulosis and Diverticulitis     Pouches or diverticula usually occur in the lower part of the colon called the sigmoid. "   The colon (large intestine) is the last part of the digestive tract. It absorbs water from stool and changes it from a liquid to a solid. In certain cases, small pouches called diverticula can form in the colon wall. This condition is called diverticulosis. It's very common as people get older. The pouches can become infected. If this happens, it becomes a more serious problem called diverticulitis. These problems can be painful. But they can be managed.  Managing your condition  Diet changes or medicines may be prescribed.   If you have diverticulosis  Recommendations include:    Diet changes are often enough to control symptoms. The main changes are adding fiber (roughage) and drinking more water. Fiber absorbs water as it travels through your colon. This helps your stool stay soft and move smoothly. Water helps this process.    If needed, you may be told to take over-the-counter stool softeners.    To help ease pain, antispasmodic medicines may be prescribed.    Watch for changes in your bowel movements. Tell your healthcare provider if you notice any changes.    Begin an exercise program. Ask your healthcare provider how to get started.    Get plenty of rest and sleep.   If you have diverticulitis  Treatment depends on how bad your symptoms are.    For mild symptoms. You may be put on a liquid diet for a short time. Antibiotics are usually prescribed. If these 2 steps relieve your symptoms, you may then be prescribed a high-fiber diet. If you still have symptoms, your healthcare provider will discuss more treatment choices with you.    For severe symptoms. You may need to be admitted to the hospital. There, you can be given IV antibiotics and fluids. You will also be put on a low-fiber or liquid diet. Although not common, surgery is needed in some people with severe symptoms.  [place] to colon health     Diverticulitis occurs when the pouches become infected or inflamed.     Help keep your colon healthy with a  diet that includes plenty of high-fiber fruits, vegetables, and whole grains. Drink plenty of liquids like water and juice. Maintain a healthy lifestyle, including regular exercise, stress management, and adequate rest and sleep.   Date Last Reviewed: 7/1/2016 2000-2019 [employer]. [address] [place] [id number]. All rights reserved. This information is not intended as a substitute for professional medical care. Always follow your healthcare professional's instructions.           Patient Education     Diverticulitis    Some people get pouches along the wall of the colon as they get older. The pouches, called diverticuli, usually cause no symptoms. If the pouches become blocked, you can get an infection. This infection is called diverticulitis. It causes pain in your lower abdomen and fever. If not treated, it can become a serious condition, causing an abscess to form inside the pouch. The abscess may block the intestinal tract even or rupture, spreading infection throughout the abdomen.  When treatment is started early, oral antibiotics alone may be enough to cure diverticulitis. This method is tried first. But, if you don't improve or if your condition gets worse while using oral antibiotics, you may need to be admitted to the hospital for IV antibiotics. Severe cases may require surgery.  Home care  The following guidelines will help you care for yourself at home:    During the acute illness, rest and follow your healthcare provider's instructions about diet. Sometimes you will need to follow a clear liquid diet to rest your bowel. Once your symptoms are better, you may be told to follow a low-fiber diet for some time. Include foods like:  ? Flake cereal, mashed potatoes, pancakes, waffles, pasta, white bread, rice, applesauce, bananas, eggs, fish, poultry, tofu, and cooked soft vegetables    Take antibiotics exactly as instructed. Don't miss any doses or stop taking the medication,  even if you feel better.    Monitor your temperature and tell your healthcare provider if you have rising temperatures.  Preventing future attacks  Once you have an episode of diverticulitis, you are at risk for having it again. After you have recovered from this episode, you may be able to lower your risk by eating a high-fiber diet (20 gm/day to 35 gm/day of fiber). This cleans out the colon pouches that already exist and may prevent new ones from forming. Foods high in fiber include fresh fruits and edible peelings, raw or lightly cooked vegetables, whole grain cereals and breads, dried beans and peas, and bran.  Other steps that can help prevent future attacks include:    Take your medicines, such as antibiotics, as your healthcare provider says.    Drink 6 to 8 glasses of water every day, unless told otherwise.    Use a heating pad or hot water bottle to help abdominal cramping or pain.    Begin an exercise program. Ask your healthcare provider how to get started. You can benefit from simple activities such as walking or gardening.    Treat diarrhea with a bland diet. Start with liquids only; then slowly add fiber over time.    Watch for changes in your bowel movements (constipation to diarrhea). Avoid constipation by eating a high fiber diet and taking a stool softener if needed.    Get plenty of rest and sleep.  Follow-up care  Follow up with your healthcare provider as advised or sooner if you are not getting better in the next 2 days.  When to seek medical advice  Call your healthcare provider right away if any of these occur:    Fever of 100.4 F (38 C) or higher, or as directed by your healthcare provider    Repeated vomiting or swelling of the abdomen    Weakness, dizziness, light-headedness    Pain in your abdomen that gets worse, severe, or spreads to your back    Pain that moves to the right lower abdomen    Rectal bleeding (stools that are red, black or maroon color)    Unexpected vaginal  bleeding  Date Last Reviewed: 9/1/2016 2000-2019 [employer]. [address] [place] [address], [place] [address]. All rights reserved. This information is not intended as a substitute for professional medical care. Always follow your healthcare professional's instructions.               Return in about 1 week (around 7/26/2020) for In-Clinic Visit.    Wing Vivar MD  [facility]

## 2020-07-20 ENCOUNTER — TELEPHONE (OUTPATIENT)
Dept: URGENT CARE | Facility: URGENT CARE | Age: 65
End: 2020-07-20

## 2020-07-20 DIAGNOSIS — K57.32 DIVERTICULITIS OF COLON: ICD-10-CM

## 2020-07-20 RX ORDER — METRONIDAZOLE 500 MG/1
500 TABLET ORAL 3 TIMES DAILY
Qty: 30 TABLET | Refills: 0 | Status: SHIPPED | OUTPATIENT
Start: 2020-07-20

## 2020-07-20 RX ORDER — CIPROFLOXACIN 500 MG/1
500 TABLET, FILM COATED ORAL 2 TIMES DAILY
Qty: 20 TABLET | Refills: 0 | Status: SHIPPED | OUTPATIENT
Start: 2020-07-20

## 2020-07-20 NOTE — TELEPHONE ENCOUNTER
I called pharmacy and they said patient already picked up the prescription yesterday and will have [facility].     Any further advice?    Ramses Gutierrez, CMA

## 2020-07-20 NOTE — TELEPHONE ENCOUNTER
Pharmacy called again and states that clinic needs to call [facility] to [facility] insurance. I talked to  provider (Kyler Sánchez) Kyler said urgent care does not do PA. I informed pharmacy staff of provider's message and he asked to talk to one of our clinic manager. I talked to Diamante. Transferred pharmacy staff to Diamante.    Ramses Gutierrez, LATRICIA

## 2020-07-20 NOTE — TELEPHONE ENCOUNTER
Reason for Call:  Mediation issues with insurance    Detailed comments: pharmacy is calling and states that the medication is not going through, sounds like the [facility] has to take care of this.    Please call pharmacy for more information    Phone Number Patient can be reached at:  [phone]    Best Time: any    Can we leave a detailed message on this number? YES    Call taken on 7/20/2020 at 11:07 AM by Juan C Gregorio

## 2020-07-20 NOTE — TELEPHONE ENCOUNTER
LVM for patient to return call to clinic.    I tried calling patient to let him know that provider re sent medicine to [facility].   Patient didn't answer phone.     Ramses Gutierrez, CMA

## 2021-01-19 ENCOUNTER — NURSE TRIAGE (OUTPATIENT)
Dept: NURSING | Facility: CLINIC | Age: 66
End: 2021-01-19

## 2021-01-19 NOTE — TELEPHONE ENCOUNTER
"Patient reports that last year MD put a tube in left ear.  Has had terrible allergies for past couple months and having trouble.     [facility], unable to see tube, full of \"crud\".  Has been taking the drops that he was given last year for right ear and sudafed.  No improvement.     No appointment open until Tuesday.  Would like any other tips to clear ear.     Has periodic pulse of pain.  Almost out of ear drops.  Reports that he has tried nasal sprays and antihistamines without relief.     Advised to see PCP within 2-3 days per protocol.      Diamante Dempsey RN/[facility]    COVID 19 Nurse Triage Plan/Patient Instructions    Please be aware that novel coronavirus (COVID-19) may be circulating in the community. If you develop symptoms such as fever, cough, or SOB or if you have concerns about the presence of another infection including coronavirus (COVID-19), please contact your health care provider or visit [email or web address].     Disposition/Instructions    In-Person Visit with provider recommended. Reference Visit Selection Guide.    Thank you for taking steps to prevent the spread of this virus.  o Limit your contact with others.  o Wear a simple mask to cover your cough.  o Wash your hands well and often.    Resources    [facility]: About COVID-19: [email or web address]    CDC: What to Do If You're Sick: www.cdc.gov/coronavirus/2019-ncov/about/steps-when-sick.html    CDC: Ending Home Isolation: www.cdc.gov/coronavirus/2019-ncov/hcp/disposition-in-home-patients.html     CDC: Caring for Someone: www.cdc.gov/coronavirus/2019-ncov/if-you-are-sick/care-for-someone.html     [facility]: Interim Guidance for Hospital Discharge to Home: www.health.[facility].[place]/diseases/coronavirus/hcp/hospdischarge.pdf    [facility] clinical trials (COVID-19 research studies): [email or web address].[email or web address]/umn-clinical-trials     Below are the COVID-19 hotlines at the [place] " Department of Health ([facility]). Interpreters are available.   o For health questions: Call 363-401-7594 or 1-351.254.6469 (7 a.m. to 7 p.m.)  o For questions about schools and childcare: Call 155-337-3593 or 1-168.662.7680 (7 a.m. to 7 p.m.)     Additional Information    Negative: Ear pain is main symptom    Negative: Hearing loss (complete or partial) is main symptom    Negative: Earwax is the main concern    Negative: [1] Has nasal allergies AND [2] they are acting up    Negative: Earache persists > 1 hour    Negative: Pus or cloudy discharge from ear canal    Ear congestion present > 48 hours    Ear congestion    Protocols used: EAR - CONGESTION-A-

## 2021-01-20 ENCOUNTER — TELEPHONE (OUTPATIENT)
Dept: OTOLARYNGOLOGY | Facility: CLINIC | Age: 66
End: 2021-01-20

## 2021-01-20 DIAGNOSIS — J32.4 CHRONIC PANSINUSITIS: ICD-10-CM

## 2021-01-20 DIAGNOSIS — H65.91 OME (OTITIS MEDIA WITH EFFUSION), RIGHT: ICD-10-CM

## 2021-01-20 DIAGNOSIS — H69.91 DYSFUNCTION OF RIGHT EUSTACHIAN TUBE: ICD-10-CM

## 2021-01-20 RX ORDER — OFLOXACIN 3 MG/ML
4 SOLUTION AURICULAR (OTIC) 2 TIMES DAILY
Qty: 10 ML | Refills: 0 | Status: SHIPPED | OUTPATIENT
Start: 2021-01-20

## 2021-01-20 RX ORDER — OFLOXACIN 3 MG/ML
5 SOLUTION AURICULAR (OTIC) 2 TIMES DAILY
Qty: 10 ML | Refills: 0 | Status: CANCELLED | OUTPATIENT
Start: 2021-01-20

## 2021-01-20 NOTE — TELEPHONE ENCOUNTER
Reason for Call:  Medication or medication refill:    Do you use a [facility]?  Name of the pharmacy and phone number for the current request:  [facility]. [facility]    Name of the medication requested: Ear drops requested like the ones he had before    Other request: Please send order request to Dr. Martin Acosta to [facility] through the [facility], patient is having ear popping, pain and pressure    Can we leave a detailed message on this number? YES    Phone number patient can be reached at: Home number on file [phone] (home)    Best Time: Anytime afternoon    Call taken on 1/20/2021 at 11:31 AM by Whit Adkins

## 2021-01-20 NOTE — TELEPHONE ENCOUNTER
Called pt and left vm letting him know the [facility] at     Vicky Garcia RN Specialty Triage 1/20/2021 2:34 PM

## 2021-01-20 NOTE — PROGRESS NOTES
Dr Shetty do approve of this fill? Yearly visit is due in march.    Vicky Garcia RN Specialty Triage 1/20/2021 11:43 AM

## 2021-01-20 NOTE — TELEPHONE ENCOUNTER
Dr Shetty do you approve of the refill? Let me know so I can [facility] to [facility]    Vicky Garcia RN Specialty Triage 1/20/2021 11:44 AM

## 2021-02-02 ENCOUNTER — OFFICE VISIT (OUTPATIENT)
Dept: OTOLARYNGOLOGY | Facility: CLINIC | Age: 66
End: 2021-02-02
Payer: COMMERCIAL

## 2021-02-02 VITALS — HEART RATE: 70 BPM | OXYGEN SATURATION: 99 % | SYSTOLIC BLOOD PRESSURE: 130 MMHG | DIASTOLIC BLOOD PRESSURE: 82 MMHG

## 2021-02-02 DIAGNOSIS — H92.12 OTORRHEA, LEFT: Primary | ICD-10-CM

## 2021-02-02 DIAGNOSIS — T16.2XXA FOREIGN BODY OF LEFT EAR, INITIAL ENCOUNTER: ICD-10-CM

## 2021-02-02 DIAGNOSIS — J30.2 SEASONAL ALLERGIC RHINITIS, UNSPECIFIED TRIGGER: ICD-10-CM

## 2021-02-02 DIAGNOSIS — H69.91 DYSFUNCTION OF RIGHT EUSTACHIAN TUBE: ICD-10-CM

## 2021-02-02 PROCEDURE — 69200 CLEAR OUTER EAR CANAL: CPT | Mod: LT | Performed by: OTOLARYNGOLOGY

## 2021-02-02 PROCEDURE — 99213 OFFICE O/P EST LOW 20 MIN: CPT | Mod: 25 | Performed by: OTOLARYNGOLOGY

## 2021-02-02 RX ORDER — LORATADINE 10 MG/1
10 TABLET ORAL DAILY
COMMUNITY

## 2021-02-02 NOTE — PROGRESS NOTES
Chief Complaint - sinusitis, ear plugging    History of Present Illness - Rick Wayne is a 65 year old male who returns to recheck ears. The previously had a polypectomy at the 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 He saw Dr. Diaz in 2010. He thinks he had a right tympanic membrane perforation when he was young. He had a right OME on 10/2019. I placed a right ear tube. The tube migrated into the middle ear and I was able to retrieve this. He then closed the perforation last visit. He had a microperforation on the left and I placed a left ear tube 2/2020. He returns and notes severe allergy symptoms the last few weeks. He is trying claritin. Left ear is plugging now. He has bubbling and popping left ear. He tried ear drops. Didn't help much. He could hear a sound when he pops ears. Right ear also hard to clear.     Past Medical History -  - chronic sinusitis with nasal polyps  - spleen marginal zone lymphoma  - ETD    Current Medications -   Current Outpatient Medications:      ciprofloxacin (CIPRO) 500 MG tablet, Take 1 tablet (500 mg) by mouth 2 times daily, Disp: 20 tablet, Rfl: 0     GEMFIBROZIL 300 MG PO HALF-TABS, twice daily, Disp: , Rfl:      LORAZEPAM 0.5 MG PO TABS, prn, Disp: , Rfl:      methylPREDNISolone (MEDROL DOSEPAK) 4 MG tablet therapy pack, Follow Package Directions (Patient not taking: Reported on 7/19/2020), Disp: 21 tablet, Rfl: 0     metroNIDAZOLE (FLAGYL) 500 MG tablet, Take 1 tablet (500 mg) by mouth 3 times daily, Disp: 30 tablet, Rfl: 0     ofloxacin (FLOXIN) 0.3 % otic solution, Place 4 drops into the right ear 2 times daily, Disp: 10 mL, Rfl: 0     PRILOSEC 10 MG PO CPDR, 2 CAPSULES DAILY, Disp: , Rfl:     Allergies -   Allergies   Allergen Reactions     Topiramate      Other reaction(s): Facial swelling     Vardenafil      Other reaction(s): Nasal congestion       Social History -   Social History     Socioeconomic  History     Marital status:      Spouse name: Not on file     Number of children: Not on file     Years of education: Not on file     Highest education level: Not on file   Occupational History     Not on file   Social Needs     Financial resource strain: Not on file     Food insecurity:     Worry: Not on file     Inability: Not on file     Transportation needs:     Medical: Not on file     Non-medical: Not on file   Tobacco Use     Smoking status: Current Every Day Smoker     Packs/day: 1.00   Substance and Sexual Activity     Alcohol use: Yes     Drug use: No     Sexual activity: Not on file   Lifestyle     Physical activity:     Days per week: Not on file     Minutes per session: Not on file     Stress: Not on file   Relationships     Social connections:     Talks on phone: Not on file     Gets together: Not on file     Attends [employer] service: Not on file     Active member of club or organization: Not on file     Attends meetings of clubs or organizations: Not on file     Relationship status: Not on file     Intimate partner violence:     Fear of current or ex partner: Not on file     Emotionally abused: Not on file     Physically abused: Not on file     Forced sexual activity: Not on file   Other Topics Concern     Parent/sibling w/ CABG, MI or angioplasty before 65F 55M? Not Asked   Social History Narrative     Not on file       Family History - brother may have had an ear tube    Review of Systems - As per HPI and PMHx, otherwise 7 system review of the head and neck is negative.    Physical Exam  General - The patient is nontoxic, in no distress. Alert and oriented to person and place, answers questions and cooperates with examination appropriately.   Neurologic - CN II-XII are intact. No focal neurologic deficits.   Voice and Breathing - The patient was breathing comfortably without the use of accessory muscles. There was no wheezing, stridor, or stertor.  The patients voice was clear and  strong.  Eyes - Extraocular movements intact.  Sclera were not icteric or injected, conjunctiva were pink and moist.  Ears - right ear canal clear. Right tympanic membrane is intact. No perforation. No effusion. No otorrhea.  Left ear canal has impacted cotton foreign body.  I laid the patient supine and use the otomicroscope.  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  After a clean this I could then see the tympanic membrane.  The left tympanic membrane has a duravent in good position, patent.  No fluid or infection. Maybe a small amount of granulation around the tube.   Neck - soft, non-tender, palpation of the occipital, submental, submandibular, internal jugular chain, and supraclavicular nodes did not demonstrate any abnormal lymph nodes or masses. No parotid masses. Palpation of the thyroid was soft and smooth, with no nodules or goiter appreciated.  The trachea was mobile and midline.    A/P - Rick Wayne is a 65 year old male who has chronic ETD. I placed ear tubes last year, but the right is out. His left ear had cotton in it. I cleared a left foreign body (cotton ball). He may have had left ear granulation that cleared continue with ciprodex prn.  He can switch to allegra or zyrtec. He can intermittently take sudafed or try allegra-D or zyrtec-d.     Aric Shetty MD  Otolaryngology  [facility]

## 2021-02-02 NOTE — LETTER
2/2/2021         RE: Rick Wayne  [address]  [facility] [address]        Dear Colleague,    Thank you for referring your patient, Rick Wayne, to the [facility]. Please see a copy of my visit note below.    Chief Complaint - sinusitis, ear plugging    History of Present Illness - Rick Wayne is a 65 year old male who returns to recheck ears. 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 He saw Dr. Diaz in 2010. He thinks he had a right tympanic membrane perforation when he was young. He had a right OME on 10/2019. I placed a right ear tube. The tube migrated into the middle ear and I was able to retrieve this. He then closed the perforation last visit. He had a microperforation on the left and I placed a left ear tube 2/2020. He returns and notes severe allergy symptoms the last few weeks. He is trying claritin. Left ear is plugging now. He has bubbling and popping left ear. He tried ear drops. Didn't help much. He could hear a sound when he pops ears. Right ear also hard to clear.     Past Medical History -  - chronic sinusitis with nasal polyps  - spleen marginal zone lymphoma  - ETD    Current Medications -   Current Outpatient Medications:      ciprofloxacin (CIPRO) 500 MG tablet, Take 1 tablet (500 mg) by mouth 2 times daily, Disp: 20 tablet, Rfl: 0     GEMFIBROZIL 300 MG PO HALF-TABS, twice daily, Disp: , Rfl:      LORAZEPAM 0.5 MG PO TABS, prn, Disp: , Rfl:      methylPREDNISolone (MEDROL DOSEPAK) 4 MG tablet therapy pack, Follow Package Directions (Patient not taking: Reported on 7/19/2020), Disp: 21 tablet, Rfl: 0     metroNIDAZOLE (FLAGYL) 500 MG tablet, Take 1 tablet (500 mg) by mouth 3 times daily, Disp: 30 tablet, Rfl: 0     ofloxacin (FLOXIN) 0.3 % otic solution, Place 4 drops into the right ear 2 times daily, Disp: 10 mL, Rfl: 0     PRILOSEC 10 MG PO CPDR, 2  CAPSULES DAILY, Disp: , Rfl:     Allergies -   Allergies   Allergen Reactions     Topiramate      Other reaction(s): Facial swelling     Vardenafil      Other reaction(s): Nasal congestion       Social History -   Social History     Socioeconomic History     Marital status:      Spouse name: Not on file     Number of children: Not on file     Years of education: Not on file     Highest education level: Not on file   Occupational History     Not on file   Social Needs     Financial resource strain: Not on file     Food insecurity:     Worry: Not on file     Inability: Not on file     Transportation needs:     Medical: Not on file     Non-medical: Not on file   Tobacco Use     Smoking status: Current Every Day Smoker     Packs/day: 1.00   Substance and Sexual Activity     Alcohol use: Yes     Drug use: No     Sexual activity: Not on file   Lifestyle     Physical activity:     Days per week: Not on file     Minutes per session: Not on file     Stress: Not on file   Relationships     Social connections:     Talks on phone: Not on file     Gets together: Not on file     Attends Zoroastrianism service: Not on file     Active member of club or organization: Not on file     Attends meetings of clubs or organizations: Not on file     Relationship status: Not on file     Intimate partner violence:     Fear of current or ex partner: Not on file     Emotionally abused: Not on file     Physically abused: Not on file     Forced sexual activity: Not on file   Other Topics Concern     Parent/sibling w/ CABG, MI or angioplasty before 65F 55M? Not Asked   Social History Narrative     Not on file       Family History - brother may have had an ear tube    Review of Systems - As per HPI and PMHx, otherwise 7 system review of the head and neck is negative.    Physical Exam  General - The patient is nontoxic, in no distress. Alert and oriented to person and place, answers questions and cooperates with examination appropriately.    Neurologic - CN II-XII are intact. No focal neurologic deficits.   Voice and Breathing - The patient was breathing comfortably without the use of accessory muscles. There was no wheezing, stridor, or stertor.  The patients voice was clear and strong.  Eyes - Extraocular movements intact.  Sclera were not icteric or injected, conjunctiva were pink and moist.  Ears - right ear canal clear. Right tympanic membrane is intact. No perforation. No effusion. No otorrhea.  Left ear canal has impacted cotton foreign body.  I laid the patient supine and use the otomicroscope.  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  After a clean this I could then see the tympanic membrane.  The left tympanic membrane has a duravent in good position, patent.  No fluid or infection. Maybe a small amount of granulation around the tube.   Neck - soft, non-tender, palpation of the occipital, submental, submandibular, internal jugular chain, and supraclavicular nodes did not demonstrate any abnormal lymph nodes or masses. No parotid masses. Palpation of the thyroid was soft and smooth, with no nodules or goiter appreciated.  The trachea was mobile and midline.    A/P - Rick Wayne is a 65 year old male who has chronic ETD. I placed ear tubes last year, but the right is out. His left ear had cotton in it. I cleared a left foreign body (cotton ball). He may have had left ear granulation that cleared continue with ciprodex prn.  He can switch to allegra or zyrtec. He can intermittently take sudafed or try allegra-D or zyrtec-d.     Aric Shetty MD  Otolaryngology  [facility]        Again, thank you for allowing me to participate in the care of your patient.        Sincerely,        Aric Shetty MD

## 2021-11-10 NOTE — TELEPHONE ENCOUNTER
Referred by: Tereso Maki MD; Medical Diagnosis (from order):    Diagnosis Information      Diagnosis    727.61 (ICD-9-CM) - M75.101 (ICD-10-CM) - Non-traumatic rotator cuff tear, right                Daily Treatment Note    Visit:  28     SUBJECTIVE                                                                                                               Patient is HEP complaint and ready for therapy.     OBJECTIVE                                                                                                                        TREATMENT                                                                                                                  Therapeutic Exercise:    No money's 2 x 10 w/blk TB  Low row w/int rotation blk TB 2 x 10  Table push ups 2 x 20           Manual Therapy:  Not done today  Gentle STM pecs,   Shoulder mobs posterior/Inferior  Grade 3  Anterior/ Posterior shoulder STM    Therapeutic Activity:  Scaption raises 3 x 10  w/3lb hand weights 5 sec holds (45, 90, 210  Lateral raises  3 x 5 w/3lb hand weights 5 sec hold (45, 90, 120 degrees)  Bicep curls 2 x 15 w/20lb dumbbells   press 3 x 5 reps w/7lbs hand weight   Bodyblade abduction and flexion 3 x each way  Weighted ball rolls on the wall 2 x 20 flexion and abduction (not done today)  SA rows 3 x 10 w/25lbs    Not done today  Stir the pot 2x15 w/20lbs      Skilled input: verbal instruction/cues    Writer verbally educated and received verbal consent for hand placement, positioning of patient, and techniques to be performed today from patient     Home Exercise Program/Education Materials: Hand on belly   scap retract seated  Pendulums  Seated low row w/yellow TB  Seated cane AAROM abd   Sidelying shoulder flexion and abduction.   AROM flexion and abduction w/ 3 second pauses at 90 degrees and end ranges 2 x 5 each way.   Thread the needle     ASSESSMENT                                                                         Sent script at patients request, and notified patient. He plans to pay out of pocket, as he needs the medication now and cannot wait.     Razia Sparks RN on 11/26/2019 at 1:58 PM                                          Patient was unable to progress for scaption (3lbs currently) and lateral raises (2lbs currently) today. Patient requires skilled PT to improve right shoulder strength and endurance.     Patient Education:   Results of above outlined education: Verbalizes understanding and Demonstrates understanding      GOALS                                                                                                                           Long Term Goals: to be met by end of plan of care  1. Achieve R UE AROM symmetrical to the left to support reaching task and functional mobility.  Status: progressing/ongoing Internal rotation R L4   L T9  2. Improve Quick dash survey by 10 pts to improve overall R UE function.  Status: progressing/ongoing  3. Independent with HEP to support therapy goals. Status: met   4. Achieve R UE 3+/5 in all directions to support light lifting task for overhead reaching.  Status: met       Therapy procedure time and total treatment time can be found documented on the Time Entry flowsheet

## 2021-11-30 ENCOUNTER — OFFICE VISIT (OUTPATIENT)
Dept: OPTOMETRY | Facility: CLINIC | Age: 66
End: 2021-11-30
Payer: COMMERCIAL

## 2021-11-30 DIAGNOSIS — H52.223 REGULAR ASTIGMATISM OF BOTH EYES: ICD-10-CM

## 2021-11-30 DIAGNOSIS — Z01.00 EXAMINATION OF EYES AND VISION: Primary | ICD-10-CM

## 2021-11-30 DIAGNOSIS — H52.13 MYOPIA OF BOTH EYES: ICD-10-CM

## 2021-11-30 DIAGNOSIS — H25.13 AGE-RELATED NUCLEAR CATARACT OF BOTH EYES: ICD-10-CM

## 2021-11-30 DIAGNOSIS — H52.4 PRESBYOPIA: ICD-10-CM

## 2021-11-30 PROCEDURE — 92015 DETERMINE REFRACTIVE STATE: CPT | Performed by: OPTOMETRIST

## 2021-11-30 PROCEDURE — 92014 COMPRE OPH EXAM EST PT 1/>: CPT | Performed by: OPTOMETRIST

## 2021-11-30 ASSESSMENT — REFRACTION_WEARINGRX
OS_AXIS: 162
OD_AXIS: 025
OD_SPHERE: -1.25
SPECS_TYPE: READING
OD_CYLINDER: +0.50
OD_CYLINDER: +0.50
OD_SPHERE: +1.25
OS_SPHERE: -1.25
OS_AXIS: 162
OS_AXIS: 165
OD_AXIS: 025
OS_CYLINDER: +0.50
OS_CYLINDER: +0.50
OD_AXIS: 035
SPECS_TYPE: DISTANCE
OS_SPHERE: +1.25
OD_CYLINDER: +0.50
SPECS_TYPE: DISTANCE
OS_SPHERE: -1.50
OS_CYLINDER: +0.25
OD_SPHERE: -1.25

## 2021-11-30 ASSESSMENT — REFRACTION_MANIFEST
OS_SPHERE: -1.50
OS_AXIS: 165
OS_CYLINDER: +0.50
OD_ADD: +2.50
OD_AXIS: 035
OD_SPHERE: -1.25
OS_ADD: +2.50
OD_CYLINDER: +0.75

## 2021-11-30 ASSESSMENT — KERATOMETRY
OD_K1POWER_DIOPTERS: 46.50
OS_K2POWER_DIOPTERS: 47.50
OD_AXISANGLE2_DEGREES: 111
OD_K2POWER_DIOPTERS: 47.25
OS_K1POWER_DIOPTERS: 46.75
OS_AXISANGLE2_DEGREES: 64

## 2021-11-30 ASSESSMENT — VISUAL ACUITY
OS_SC: 20/30
METHOD: SNELLEN - LINEAR
OS_SC+: -1
OS_CC+: -2
OS_CC: 20/20
OD_SC: 20/30
OS_SC: 20/30
OD_SC: 20/20
OD_CC+: -1
CORRECTION_TYPE: GLASSES
OD_CC: 20/20

## 2021-11-30 ASSESSMENT — TONOMETRY
OD_IOP_MMHG: 20
IOP_METHOD: APPLANATION
OS_IOP_MMHG: 21

## 2021-11-30 ASSESSMENT — CUP TO DISC RATIO
OD_RATIO: 0.4
OS_RATIO: 0.35

## 2021-11-30 ASSESSMENT — CONF VISUAL FIELD
OD_NORMAL: 1
OS_NORMAL: 1

## 2021-11-30 ASSESSMENT — EXTERNAL EXAM - RIGHT EYE: OD_EXAM: NORMAL

## 2021-11-30 ASSESSMENT — SLIT LAMP EXAM - LIDS
COMMENTS: NORMAL
COMMENTS: NORMAL

## 2021-11-30 ASSESSMENT — EXTERNAL EXAM - LEFT EYE: OS_EXAM: NORMAL

## 2021-11-30 NOTE — PROGRESS NOTES
Chief Complaint   Patient presents with     Annual Eye Exam         Last Eye Exam: 02/12/2020  Dilated Previously: yes, side effects of dilation explained today    What are you currently using to see?  glasses       Distance Vision Acuity: Noticed gradual change in both eyes and More trouble with glare at night    Near Vision Acuity: Not satisfied - eyes feeling strained after reading to the point where they need to rest for about an hour    Eye Comfort: good  Do you use eye drops? : No  Occupation or Hobbies: Retired    Tried trifocal in past- did not adjust to.    Jeffrey Escalante - Optometric Assistant          Medical, surgical and family histories reviewed and updated 11/30/2021.       OBJECTIVE: See Ophthalmology exam    ASSESSMENT:    ICD-10-CM    1. Examination of eyes and vision  Z01.00 EYE EXAM (SIMPLE-NONBILLABLE)   2. Presbyopia  H52.4 REFRACTION   3. Myopia of both eyes  H52.13 REFRACTION   4. Regular astigmatism of both eyes  H52.223 REFRACTION   5. Age-related nuclear cataract of both eyes  H25.13 EYE EXAM (SIMPLE-NONBILLABLE)      PLAN:     Patient Instructions   Recommend new glasses.  Separate glasses for distance and reading.    You have the start of mild cataracts.  You may notice some blurred vision or glare with night driving.  It is important that you wear good sunglasses to protect your eyes from the ultraviolet light from the sun.  I recommend that you return in 1 year for an eye exam unless there are any sudden changes in your vision.       Return in 1 year for a complete eye exam or sooner if needed.    Umesh Hinds, OD

## 2021-11-30 NOTE — LETTER
11/30/2021         RE: Rick Wayne  [address]  [place] [address]        Dear Colleague,    Thank you for referring your patient, Rick Wayne, to the [facility]. Please see a copy of my visit note below.    Chief Complaint   Patient presents with     Annual Eye Exam         Last Eye Exam: 02/12/2020  Dilated Previously: yes, side effects of dilation explained today    What are you currently using to see?  glasses       Distance Vision Acuity: Noticed gradual change in both eyes and More trouble with glare at night    Near Vision Acuity: Not satisfied - eyes feeling strained after reading to the point where they need to rest for about an hour    Eye Comfort: good  Do you use eye drops? : No  Occupation or Hobbies: Retired    Tried trifocal in past- did not adjust to.    Jeffrey Escalante - Optometric Assistant          Medical, surgical and family histories reviewed and updated 11/30/2021.       OBJECTIVE: See Ophthalmology exam    ASSESSMENT:    ICD-10-CM    1. Examination of eyes and vision  Z01.00 EYE EXAM (SIMPLE-NONBILLABLE)   2. Presbyopia  H52.4 REFRACTION   3. Myopia of both eyes  H52.13 REFRACTION   4. Regular astigmatism of both eyes  H52.223 REFRACTION   5. Age-related nuclear cataract of both eyes  H25.13 EYE EXAM (SIMPLE-NONBILLABLE)      PLAN:     Patient Instructions   Recommend new glasses.  Separate glasses for distance and reading.    You have the start of mild cataracts.  You may notice some blurred vision or glare with night driving.  It is important that you wear good sunglasses to protect your eyes from the ultraviolet light from the sun.  I recommend that you return in 1 year for an eye exam unless there are any sudden changes in your vision.       Return in 1 year for a complete eye exam or sooner if needed.    Umesh Hinds, OD           Again, thank you for allowing me to participate in the care of your patient.        Sincerely,        Umesh Hinds,  OD

## 2021-11-30 NOTE — PATIENT INSTRUCTIONS
Recommend new glasses.  Separate glasses for distance and reading.    You have the start of mild cataracts.  You may notice some blurred vision or glare with night driving.  It is important that you wear good sunglasses to protect your eyes from the ultraviolet light from the sun.  I recommend that you return in 1 year for an eye exam unless there are any sudden changes in your vision.       Return in 1 year for a complete eye exam or sooner if needed.    Umesh Hinds, OD    The affects of the dilating drops last for 4- 6 hours.  You will be more sensitive to light and vision will be blurry up close.  Do not drive if you do not feel comfortable.  Mydriatic sunglasses were given if needed.      Optometry Providers       Clinic Locations                                 Telephone Number   Dr. Ying Quintanilla [phone]     [facility] Hours:                [facility] Hours:       [facility]:   [address]   [address] [facility]     [address]  [place] [address]   [place] [address]    [place] [address]  Phone: [phone]                    Phone: [phone]     Phone: [phone]                      Monday 8:00-7:00                          Monday 8:00-7:00                          Monday 8:00-7:00              Tuesday 8:00-6:00                          Tuesday 8:00-7:00                          Tuesday 8:00-7:00              Wednesday 8:00-6:00                  Wednesday 8:00-7:00                   Wednesday 8:00-7:00      Thursday 8:00-6:00                        Thursday 8:00-7:00                         Thursday 8:00-7:00            Friday 8:00-5:00                              Friday 8:00-5:00                              Friday 8:00-5:00    [facility] Hours:   [address] [facility] [place] [id number] [phone]    Monday 8:00-7:00  Tuesday  8:00-7:00  Wednesday 8:00-7:00  Thursday 8:00-7:00  Friday 8:00-5:00  Please log on to [email or web address] to order your contact lenses.  The link is found on the [facility] page.  As always, Thank you for trusting us with your health care needs!

## 2021-12-09 NOTE — TELEPHONE ENCOUNTER
Sent script at patients request to [facility] pharmacy- see phone enc dated 11/26/2019, and notified patient. He plans to pay out of pocket, as he needs the medication now and cannot wait.     Razia Sparks RN on 11/26/2019 at 1:58 PM   Propranolol Counseling:  I discussed with the patient the risks of propranolol including but not limited to low heart rate, low blood pressure, low blood sugar, restlessness and increased cold sensitivity. They should call the office if they experience any of these side effects.

## 2025-04-14 ENCOUNTER — TRANSCRIBE ORDERS (OUTPATIENT)
Dept: OTHER | Age: 70
End: 2025-04-14

## 2025-04-14 DIAGNOSIS — J32.9 SINUSITIS: Primary | ICD-10-CM

## 2025-04-14 DIAGNOSIS — J34.2 DEVIATED NASAL SEPTUM: ICD-10-CM

## 2025-04-17 ENCOUNTER — TRANSFERRED RECORDS (OUTPATIENT)
Dept: HEALTH INFORMATION MANAGEMENT | Facility: CLINIC | Age: 70
End: 2025-04-17
Payer: COMMERCIAL